# Patient Record
Sex: FEMALE | Race: WHITE | NOT HISPANIC OR LATINO | Employment: UNEMPLOYED | URBAN - METROPOLITAN AREA
[De-identification: names, ages, dates, MRNs, and addresses within clinical notes are randomized per-mention and may not be internally consistent; named-entity substitution may affect disease eponyms.]

---

## 2017-03-09 ENCOUNTER — COMMUNICATION - HEALTHEAST (OUTPATIENT)
Dept: INTERNAL MEDICINE | Facility: CLINIC | Age: 62
End: 2017-03-09

## 2017-05-26 ENCOUNTER — OFFICE VISIT - HEALTHEAST (OUTPATIENT)
Dept: INTERNAL MEDICINE | Facility: CLINIC | Age: 62
End: 2017-05-26

## 2017-05-26 DIAGNOSIS — F32.A DEPRESSION: ICD-10-CM

## 2017-05-26 DIAGNOSIS — Z12.39 BREAST CANCER SCREENING: ICD-10-CM

## 2017-05-26 DIAGNOSIS — F41.9 ANXIETY: ICD-10-CM

## 2017-05-26 DIAGNOSIS — E03.9 HYPOTHYROIDISM, UNSPECIFIED TYPE: ICD-10-CM

## 2017-05-26 DIAGNOSIS — G43.909 MIGRAINES: ICD-10-CM

## 2017-05-26 RX ORDER — ESCITALOPRAM OXALATE 10 MG/1
TABLET ORAL
Refills: 1 | Status: SHIPPED | COMMUNITY
Start: 2017-05-24 | End: 2024-01-11

## 2017-05-26 RX ORDER — ESCITALOPRAM OXALATE 5 MG/1
TABLET ORAL
Refills: 1 | Status: SHIPPED | COMMUNITY
Start: 2017-05-16 | End: 2024-01-11

## 2017-05-26 ASSESSMENT — MIFFLIN-ST. JEOR: SCORE: 1128.54

## 2017-05-31 ENCOUNTER — COMMUNICATION - HEALTHEAST (OUTPATIENT)
Dept: INTERNAL MEDICINE | Facility: CLINIC | Age: 62
End: 2017-05-31

## 2017-06-25 ENCOUNTER — COMMUNICATION - HEALTHEAST (OUTPATIENT)
Dept: INTERNAL MEDICINE | Facility: CLINIC | Age: 62
End: 2017-06-25

## 2017-06-27 ENCOUNTER — COMMUNICATION - HEALTHEAST (OUTPATIENT)
Dept: INTERNAL MEDICINE | Facility: CLINIC | Age: 62
End: 2017-06-27

## 2017-06-28 ENCOUNTER — AMBULATORY - HEALTHEAST (OUTPATIENT)
Dept: INTERNAL MEDICINE | Facility: CLINIC | Age: 62
End: 2017-06-28

## 2017-06-28 DIAGNOSIS — E03.9 HYPOTHYROID: ICD-10-CM

## 2017-07-05 ENCOUNTER — AMBULATORY - HEALTHEAST (OUTPATIENT)
Dept: LAB | Facility: CLINIC | Age: 62
End: 2017-07-05

## 2017-07-05 DIAGNOSIS — E03.9 HYPOTHYROID: ICD-10-CM

## 2017-07-06 ENCOUNTER — COMMUNICATION - HEALTHEAST (OUTPATIENT)
Dept: INTERNAL MEDICINE | Facility: CLINIC | Age: 62
End: 2017-07-06

## 2017-07-12 ENCOUNTER — COMMUNICATION - HEALTHEAST (OUTPATIENT)
Dept: INTERNAL MEDICINE | Facility: CLINIC | Age: 62
End: 2017-07-12

## 2017-07-31 ENCOUNTER — COMMUNICATION - HEALTHEAST (OUTPATIENT)
Dept: INTERNAL MEDICINE | Facility: CLINIC | Age: 62
End: 2017-07-31

## 2017-08-21 ENCOUNTER — COMMUNICATION - HEALTHEAST (OUTPATIENT)
Dept: INTERNAL MEDICINE | Facility: CLINIC | Age: 62
End: 2017-08-21

## 2017-09-05 ENCOUNTER — COMMUNICATION - HEALTHEAST (OUTPATIENT)
Dept: INTERNAL MEDICINE | Facility: CLINIC | Age: 62
End: 2017-09-05

## 2017-09-06 ENCOUNTER — AMBULATORY - HEALTHEAST (OUTPATIENT)
Dept: INTERNAL MEDICINE | Facility: CLINIC | Age: 62
End: 2017-09-06

## 2017-09-06 ENCOUNTER — AMBULATORY - HEALTHEAST (OUTPATIENT)
Dept: LAB | Facility: CLINIC | Age: 62
End: 2017-09-06

## 2017-09-06 DIAGNOSIS — E03.9 HYPOTHYROID: ICD-10-CM

## 2017-09-12 ENCOUNTER — COMMUNICATION - HEALTHEAST (OUTPATIENT)
Dept: INTERNAL MEDICINE | Facility: CLINIC | Age: 62
End: 2017-09-12

## 2017-09-19 ENCOUNTER — COMMUNICATION - HEALTHEAST (OUTPATIENT)
Dept: INTERNAL MEDICINE | Facility: CLINIC | Age: 62
End: 2017-09-19

## 2017-09-24 ENCOUNTER — COMMUNICATION - HEALTHEAST (OUTPATIENT)
Dept: INTERNAL MEDICINE | Facility: CLINIC | Age: 62
End: 2017-09-24

## 2017-10-31 ENCOUNTER — COMMUNICATION - HEALTHEAST (OUTPATIENT)
Dept: INTERNAL MEDICINE | Facility: CLINIC | Age: 62
End: 2017-10-31

## 2017-11-01 ENCOUNTER — AMBULATORY - HEALTHEAST (OUTPATIENT)
Dept: INTERNAL MEDICINE | Facility: CLINIC | Age: 62
End: 2017-11-01

## 2017-11-01 ENCOUNTER — AMBULATORY - HEALTHEAST (OUTPATIENT)
Dept: NURSING | Facility: CLINIC | Age: 62
End: 2017-11-01

## 2017-11-01 DIAGNOSIS — Z11.1 SCREENING FOR TUBERCULOSIS: ICD-10-CM

## 2017-11-03 ENCOUNTER — AMBULATORY - HEALTHEAST (OUTPATIENT)
Dept: NURSING | Facility: CLINIC | Age: 62
End: 2017-11-03

## 2017-11-03 DIAGNOSIS — Z00.00 HEALTHCARE MAINTENANCE: ICD-10-CM

## 2017-11-08 ENCOUNTER — AMBULATORY - HEALTHEAST (OUTPATIENT)
Dept: FAMILY MEDICINE | Facility: CLINIC | Age: 62
End: 2017-11-08

## 2017-11-08 DIAGNOSIS — Z11.1 VISIT FOR MANTOUX TEST: ICD-10-CM

## 2017-11-09 ENCOUNTER — AMBULATORY - HEALTHEAST (OUTPATIENT)
Dept: INTERNAL MEDICINE | Facility: CLINIC | Age: 62
End: 2017-11-09

## 2017-11-09 DIAGNOSIS — Z00.00 HEALTHCARE MAINTENANCE: ICD-10-CM

## 2017-11-10 ENCOUNTER — AMBULATORY - HEALTHEAST (OUTPATIENT)
Dept: NURSING | Facility: CLINIC | Age: 62
End: 2017-11-10

## 2017-11-13 ENCOUNTER — AMBULATORY - HEALTHEAST (OUTPATIENT)
Dept: NURSING | Facility: CLINIC | Age: 62
End: 2017-11-13

## 2017-11-13 DIAGNOSIS — Z00.00 HEALTHCARE MAINTENANCE: ICD-10-CM

## 2018-03-08 ENCOUNTER — COMMUNICATION - HEALTHEAST (OUTPATIENT)
Dept: FAMILY MEDICINE | Facility: CLINIC | Age: 63
End: 2018-03-08

## 2018-03-08 ENCOUNTER — OFFICE VISIT - HEALTHEAST (OUTPATIENT)
Dept: FAMILY MEDICINE | Facility: CLINIC | Age: 63
End: 2018-03-08

## 2018-03-08 DIAGNOSIS — M79.10 MYALGIA: ICD-10-CM

## 2018-03-08 DIAGNOSIS — Z12.31 VISIT FOR SCREENING MAMMOGRAM: ICD-10-CM

## 2018-03-08 DIAGNOSIS — R53.83 FATIGUE: ICD-10-CM

## 2018-03-08 DIAGNOSIS — Z72.0 NICOTINE ABUSE: ICD-10-CM

## 2018-03-08 DIAGNOSIS — R07.89 CHEST DISCOMFORT: ICD-10-CM

## 2018-03-08 DIAGNOSIS — Z12.39 BREAST CANCER SCREENING: ICD-10-CM

## 2018-03-08 DIAGNOSIS — Z12.39 SCREENING BREAST EXAMINATION: ICD-10-CM

## 2018-03-08 LAB
ALBUMIN SERPL-MCNC: 3.8 G/DL (ref 3.5–5)
ALBUMIN UR-MCNC: ABNORMAL MG/DL
ALP SERPL-CCNC: 86 U/L (ref 45–120)
ALT SERPL W P-5'-P-CCNC: 15 U/L (ref 0–45)
AMORPH CRY #/AREA URNS HPF: ABNORMAL /[HPF]
ANION GAP SERPL CALCULATED.3IONS-SCNC: 7 MMOL/L (ref 5–18)
APPEARANCE UR: ABNORMAL
AST SERPL W P-5'-P-CCNC: 17 U/L (ref 0–40)
BACTERIA #/AREA URNS HPF: ABNORMAL HPF
BASOPHILS # BLD AUTO: 0 THOU/UL (ref 0–0.2)
BASOPHILS NFR BLD AUTO: 1 % (ref 0–2)
BILIRUB SERPL-MCNC: 0.7 MG/DL (ref 0–1)
BILIRUB UR QL STRIP: NEGATIVE
BUN SERPL-MCNC: 12 MG/DL (ref 8–22)
CALCIUM SERPL-MCNC: 9.5 MG/DL (ref 8.5–10.5)
CHLORIDE BLD-SCNC: 106 MMOL/L (ref 98–107)
CO2 SERPL-SCNC: 27 MMOL/L (ref 22–31)
COLOR UR AUTO: YELLOW
CREAT SERPL-MCNC: 0.68 MG/DL (ref 0.6–1.1)
EOSINOPHIL # BLD AUTO: 0.3 THOU/UL (ref 0–0.4)
EOSINOPHIL NFR BLD AUTO: 6 % (ref 0–6)
ERYTHROCYTE [DISTWIDTH] IN BLOOD BY AUTOMATED COUNT: 11.7 % (ref 11–14.5)
ERYTHROCYTE [SEDIMENTATION RATE] IN BLOOD BY WESTERGREN METHOD: 5 MM/HR (ref 0–20)
GFR SERPL CREATININE-BSD FRML MDRD: >60 ML/MIN/1.73M2
GLUCOSE BLD-MCNC: 87 MG/DL (ref 70–125)
GLUCOSE UR STRIP-MCNC: NEGATIVE MG/DL
HCT VFR BLD AUTO: 41.5 % (ref 35–47)
HGB BLD-MCNC: 14.5 G/DL (ref 12–16)
HGB UR QL STRIP: NEGATIVE
KETONES UR STRIP-MCNC: NEGATIVE MG/DL
LEUKOCYTE ESTERASE UR QL STRIP: NEGATIVE
LYMPHOCYTES # BLD AUTO: 2.5 THOU/UL (ref 0.8–4.4)
LYMPHOCYTES NFR BLD AUTO: 42 % (ref 20–40)
MCH RBC QN AUTO: 32 PG (ref 27–34)
MCHC RBC AUTO-ENTMCNC: 34.9 G/DL (ref 32–36)
MCV RBC AUTO: 92 FL (ref 80–100)
MONOCYTES # BLD AUTO: 0.4 THOU/UL (ref 0–0.9)
MONOCYTES NFR BLD AUTO: 7 % (ref 2–10)
NEUTROPHILS # BLD AUTO: 2.7 THOU/UL (ref 2–7.7)
NEUTROPHILS NFR BLD AUTO: 45 % (ref 50–70)
NITRATE UR QL: NEGATIVE
PH UR STRIP: 7.5 [PH] (ref 5–8)
PLATELET # BLD AUTO: 263 THOU/UL (ref 140–440)
PMV BLD AUTO: 7.6 FL (ref 7–10)
POTASSIUM BLD-SCNC: 4.5 MMOL/L (ref 3.5–5)
PROT SERPL-MCNC: 6.9 G/DL (ref 6–8)
RBC # BLD AUTO: 4.52 MILL/UL (ref 3.8–5.4)
RBC #/AREA URNS AUTO: ABNORMAL HPF
SODIUM SERPL-SCNC: 140 MMOL/L (ref 136–145)
SP GR UR STRIP: 1.01 (ref 1–1.03)
SQUAMOUS #/AREA URNS AUTO: ABNORMAL LPF
TSH SERPL DL<=0.005 MIU/L-ACNC: 0.43 UIU/ML (ref 0.3–5)
UROBILINOGEN UR STRIP-ACNC: ABNORMAL
WBC #/AREA URNS AUTO: ABNORMAL HPF
WBC: 5.9 THOU/UL (ref 4–11)

## 2018-03-09 ENCOUNTER — COMMUNICATION - HEALTHEAST (OUTPATIENT)
Dept: FAMILY MEDICINE | Facility: CLINIC | Age: 63
End: 2018-03-09

## 2018-03-09 ENCOUNTER — AMBULATORY - HEALTHEAST (OUTPATIENT)
Dept: FAMILY MEDICINE | Facility: CLINIC | Age: 63
End: 2018-03-09

## 2018-03-09 DIAGNOSIS — Z12.39 BREAST CANCER SCREENING: ICD-10-CM

## 2018-03-09 DIAGNOSIS — Z12.31 VISIT FOR SCREENING MAMMOGRAM: ICD-10-CM

## 2018-03-09 LAB
25(OH)D3 SERPL-MCNC: 70.5 NG/ML (ref 30–80)
25(OH)D3 SERPL-MCNC: 70.5 NG/ML (ref 30–80)

## 2018-04-02 ENCOUNTER — COMMUNICATION - HEALTHEAST (OUTPATIENT)
Dept: FAMILY MEDICINE | Facility: CLINIC | Age: 63
End: 2018-04-02

## 2018-05-30 ENCOUNTER — COMMUNICATION - HEALTHEAST (OUTPATIENT)
Dept: FAMILY MEDICINE | Facility: CLINIC | Age: 63
End: 2018-05-30

## 2018-06-11 ENCOUNTER — RECORDS - HEALTHEAST (OUTPATIENT)
Dept: ADMINISTRATIVE | Facility: OTHER | Age: 63
End: 2018-06-11

## 2018-06-19 ENCOUNTER — COMMUNICATION - HEALTHEAST (OUTPATIENT)
Dept: FAMILY MEDICINE | Facility: CLINIC | Age: 63
End: 2018-06-19

## 2018-09-19 ENCOUNTER — COMMUNICATION - HEALTHEAST (OUTPATIENT)
Dept: FAMILY MEDICINE | Facility: CLINIC | Age: 63
End: 2018-09-19

## 2018-09-19 DIAGNOSIS — E03.9 HYPOTHYROIDISM, UNSPECIFIED TYPE: ICD-10-CM

## 2018-10-15 ENCOUNTER — COMMUNICATION - HEALTHEAST (OUTPATIENT)
Dept: INTERNAL MEDICINE | Facility: CLINIC | Age: 63
End: 2018-10-15

## 2019-06-14 ENCOUNTER — COMMUNICATION - HEALTHEAST (OUTPATIENT)
Dept: FAMILY MEDICINE | Facility: CLINIC | Age: 64
End: 2019-06-14

## 2019-06-14 DIAGNOSIS — E03.9 HYPOTHYROIDISM, UNSPECIFIED TYPE: ICD-10-CM

## 2019-08-02 ENCOUNTER — COMMUNICATION - HEALTHEAST (OUTPATIENT)
Dept: FAMILY MEDICINE | Facility: CLINIC | Age: 64
End: 2019-08-02

## 2019-08-02 ENCOUNTER — COMMUNICATION - HEALTHEAST (OUTPATIENT)
Dept: SCHEDULING | Facility: CLINIC | Age: 64
End: 2019-08-02

## 2019-09-16 ENCOUNTER — COMMUNICATION - HEALTHEAST (OUTPATIENT)
Dept: FAMILY MEDICINE | Facility: CLINIC | Age: 64
End: 2019-09-16

## 2019-09-16 DIAGNOSIS — E03.9 HYPOTHYROIDISM, UNSPECIFIED TYPE: ICD-10-CM

## 2019-10-23 ENCOUNTER — OFFICE VISIT - HEALTHEAST (OUTPATIENT)
Dept: FAMILY MEDICINE | Facility: CLINIC | Age: 64
End: 2019-10-23

## 2019-10-23 DIAGNOSIS — Z12.11 SCREEN FOR COLON CANCER: ICD-10-CM

## 2019-10-23 DIAGNOSIS — F51.12 INSUFFICIENT SLEEP SYNDROME: ICD-10-CM

## 2019-10-23 DIAGNOSIS — Z12.31 VISIT FOR SCREENING MAMMOGRAM: ICD-10-CM

## 2019-10-23 DIAGNOSIS — E03.9 HYPOTHYROIDISM, UNSPECIFIED TYPE: ICD-10-CM

## 2019-10-23 DIAGNOSIS — R53.83 FATIGUE, UNSPECIFIED TYPE: ICD-10-CM

## 2019-10-23 LAB
ALBUMIN SERPL-MCNC: 4 G/DL (ref 3.5–5)
ALP SERPL-CCNC: 92 U/L (ref 45–120)
ALT SERPL W P-5'-P-CCNC: 29 U/L (ref 0–45)
ANION GAP SERPL CALCULATED.3IONS-SCNC: 5 MMOL/L (ref 5–18)
AST SERPL W P-5'-P-CCNC: 21 U/L (ref 0–40)
BILIRUB SERPL-MCNC: 0.9 MG/DL (ref 0–1)
BUN SERPL-MCNC: 13 MG/DL (ref 8–22)
CALCIUM SERPL-MCNC: 9.6 MG/DL (ref 8.5–10.5)
CHLORIDE BLD-SCNC: 107 MMOL/L (ref 98–107)
CO2 SERPL-SCNC: 28 MMOL/L (ref 22–31)
CREAT SERPL-MCNC: 0.68 MG/DL (ref 0.6–1.1)
ERYTHROCYTE [DISTWIDTH] IN BLOOD BY AUTOMATED COUNT: 12 % (ref 11–14.5)
GFR SERPL CREATININE-BSD FRML MDRD: >60 ML/MIN/1.73M2
GLUCOSE BLD-MCNC: 78 MG/DL (ref 70–125)
HCT VFR BLD AUTO: 40.9 % (ref 35–47)
HGB BLD-MCNC: 14 G/DL (ref 12–16)
MCH RBC QN AUTO: 32.5 PG (ref 27–34)
MCHC RBC AUTO-ENTMCNC: 34.3 G/DL (ref 32–36)
MCV RBC AUTO: 95 FL (ref 80–100)
PLATELET # BLD AUTO: 264 THOU/UL (ref 140–440)
PMV BLD AUTO: 7.7 FL (ref 7–10)
POTASSIUM BLD-SCNC: 4.2 MMOL/L (ref 3.5–5)
PROT SERPL-MCNC: 6.8 G/DL (ref 6–8)
RBC # BLD AUTO: 4.31 MILL/UL (ref 3.8–5.4)
SODIUM SERPL-SCNC: 140 MMOL/L (ref 136–145)
TSH SERPL DL<=0.005 MIU/L-ACNC: 0.69 UIU/ML (ref 0.3–5)
WBC: 6 THOU/UL (ref 4–11)

## 2019-10-23 ASSESSMENT — MIFFLIN-ST. JEOR: SCORE: 1085.91

## 2020-02-03 ENCOUNTER — OFFICE VISIT - HEALTHEAST (OUTPATIENT)
Dept: FAMILY MEDICINE | Facility: CLINIC | Age: 65
End: 2020-02-03

## 2020-02-03 ENCOUNTER — COMMUNICATION - HEALTHEAST (OUTPATIENT)
Dept: FAMILY MEDICINE | Facility: CLINIC | Age: 65
End: 2020-02-03

## 2020-02-03 DIAGNOSIS — Z72.0 NICOTINE ABUSE: ICD-10-CM

## 2020-02-03 DIAGNOSIS — S62.101D CLOSED FRACTURE OF RIGHT WRIST WITH ROUTINE HEALING, SUBSEQUENT ENCOUNTER: ICD-10-CM

## 2020-02-03 DIAGNOSIS — E03.9 HYPOTHYROIDISM, UNSPECIFIED TYPE: ICD-10-CM

## 2020-02-03 DIAGNOSIS — Z98.890 PONV (POSTOPERATIVE NAUSEA AND VOMITING): ICD-10-CM

## 2020-02-03 DIAGNOSIS — Z01.818 PREOP GENERAL PHYSICAL EXAM: ICD-10-CM

## 2020-02-03 DIAGNOSIS — Z01.818 ENCOUNTER FOR PREOPERATIVE EXAMINATION FOR GENERAL SURGICAL PROCEDURE: ICD-10-CM

## 2020-02-03 DIAGNOSIS — R11.2 PONV (POSTOPERATIVE NAUSEA AND VOMITING): ICD-10-CM

## 2020-02-03 ASSESSMENT — MIFFLIN-ST. JEOR: SCORE: 1066.17

## 2020-02-04 LAB
25(OH)D3 SERPL-MCNC: 42.5 NG/ML (ref 30–80)
25(OH)D3 SERPL-MCNC: 42.5 NG/ML (ref 30–80)

## 2020-11-16 ENCOUNTER — COMMUNICATION - HEALTHEAST (OUTPATIENT)
Dept: FAMILY MEDICINE | Facility: CLINIC | Age: 65
End: 2020-11-16

## 2020-11-16 DIAGNOSIS — E03.9 HYPOTHYROIDISM, UNSPECIFIED TYPE: ICD-10-CM

## 2021-01-12 ENCOUNTER — OFFICE VISIT - HEALTHEAST (OUTPATIENT)
Dept: FAMILY MEDICINE | Facility: CLINIC | Age: 66
End: 2021-01-12

## 2021-01-12 ENCOUNTER — AMBULATORY - HEALTHEAST (OUTPATIENT)
Dept: OTHER | Facility: CLINIC | Age: 66
End: 2021-01-12

## 2021-01-12 DIAGNOSIS — L60.3 DYSTROPHIC NAIL: ICD-10-CM

## 2021-01-12 DIAGNOSIS — E03.9 HYPOTHYROIDISM, UNSPECIFIED TYPE: ICD-10-CM

## 2021-01-12 LAB
T4 FREE SERPL-MCNC: 1.3 NG/DL (ref 0.7–1.8)
TSH SERPL DL<=0.005 MIU/L-ACNC: 0.16 UIU/ML (ref 0.3–5)

## 2021-01-14 ENCOUNTER — AMBULATORY - HEALTHEAST (OUTPATIENT)
Dept: FAMILY MEDICINE | Facility: CLINIC | Age: 66
End: 2021-01-14

## 2021-01-14 DIAGNOSIS — E03.9 HYPOTHYROIDISM, UNSPECIFIED TYPE: ICD-10-CM

## 2021-03-02 ENCOUNTER — AMBULATORY - HEALTHEAST (OUTPATIENT)
Dept: LAB | Facility: CLINIC | Age: 66
End: 2021-03-02

## 2021-03-02 DIAGNOSIS — E03.9 HYPOTHYROIDISM, UNSPECIFIED TYPE: ICD-10-CM

## 2021-03-02 DIAGNOSIS — L60.3 DYSTROPHIC NAIL: ICD-10-CM

## 2021-03-02 LAB
ALBUMIN SERPL-MCNC: 4.1 G/DL (ref 3.5–5)
ALP SERPL-CCNC: 89 U/L (ref 45–120)
ALT SERPL W P-5'-P-CCNC: 11 U/L (ref 0–45)
AST SERPL W P-5'-P-CCNC: 13 U/L (ref 0–40)
BILIRUB DIRECT SERPL-MCNC: 0.2 MG/DL
BILIRUB SERPL-MCNC: 0.5 MG/DL (ref 0–1)
CHOLEST SERPL-MCNC: 213 MG/DL
FASTING STATUS PATIENT QL REPORTED: NO
FASTING STATUS PATIENT QL REPORTED: NO
GLUCOSE BLD-MCNC: 80 MG/DL (ref 74–125)
HDLC SERPL-MCNC: 70 MG/DL
LDLC SERPL CALC-MCNC: 127 MG/DL
PROT SERPL-MCNC: 6.8 G/DL (ref 6–8)
TRIGL SERPL-MCNC: 78 MG/DL
TSH SERPL DL<=0.005 MIU/L-ACNC: 0.55 UIU/ML (ref 0.3–5)

## 2021-03-15 ENCOUNTER — COMMUNICATION - HEALTHEAST (OUTPATIENT)
Dept: FAMILY MEDICINE | Facility: CLINIC | Age: 66
End: 2021-03-15

## 2021-03-15 DIAGNOSIS — L60.3 DYSTROPHIC NAIL: ICD-10-CM

## 2021-04-12 ENCOUNTER — OFFICE VISIT - HEALTHEAST (OUTPATIENT)
Dept: FAMILY MEDICINE | Facility: CLINIC | Age: 66
End: 2021-04-12

## 2021-04-12 DIAGNOSIS — L60.3 DYSTROPHIC NAIL: ICD-10-CM

## 2021-04-12 DIAGNOSIS — Z12.11 SPECIAL SCREENING FOR MALIGNANT NEOPLASMS, COLON: ICD-10-CM

## 2021-04-12 DIAGNOSIS — E03.9 HYPOTHYROIDISM, UNSPECIFIED TYPE: ICD-10-CM

## 2021-04-12 RX ORDER — LEVOTHYROXINE SODIUM 100 UG/1
TABLET ORAL
Qty: 90 TABLET | Refills: 1 | Status: SHIPPED | OUTPATIENT
Start: 2021-04-12 | End: 2021-10-15

## 2021-04-12 RX ORDER — ZOLPIDEM TARTRATE 5 MG/1
5 TABLET ORAL
Status: SHIPPED | COMMUNITY
Start: 2021-03-26 | End: 2024-01-11

## 2021-04-12 ASSESSMENT — MIFFLIN-ST. JEOR: SCORE: 1117.88

## 2021-05-13 ENCOUNTER — AMBULATORY - HEALTHEAST (OUTPATIENT)
Dept: LAB | Facility: CLINIC | Age: 66
End: 2021-05-13

## 2021-05-13 DIAGNOSIS — E03.9 HYPOTHYROIDISM, UNSPECIFIED TYPE: ICD-10-CM

## 2021-05-13 DIAGNOSIS — L60.3 DYSTROPHIC NAIL: ICD-10-CM

## 2021-05-13 LAB
ALBUMIN SERPL-MCNC: 4 G/DL (ref 3.5–5)
ALP SERPL-CCNC: 76 U/L (ref 45–120)
ALT SERPL W P-5'-P-CCNC: 13 U/L (ref 0–45)
AST SERPL W P-5'-P-CCNC: 15 U/L (ref 0–40)
BILIRUB DIRECT SERPL-MCNC: 0.2 MG/DL
BILIRUB SERPL-MCNC: 0.6 MG/DL (ref 0–1)
PROT SERPL-MCNC: 6.6 G/DL (ref 6–8)
TSH SERPL DL<=0.005 MIU/L-ACNC: 0.64 UIU/ML (ref 0.3–5)

## 2021-05-24 ENCOUNTER — COMMUNICATION - HEALTHEAST (OUTPATIENT)
Dept: FAMILY MEDICINE | Facility: CLINIC | Age: 66
End: 2021-05-24

## 2021-05-24 DIAGNOSIS — L60.3 DYSTROPHIC NAIL: ICD-10-CM

## 2021-05-25 RX ORDER — TERBINAFINE HYDROCHLORIDE 250 MG/1
250 TABLET ORAL DAILY
Qty: 42 TABLET | Refills: 0 | Status: SHIPPED | OUTPATIENT
Start: 2021-05-25 | End: 2021-07-06

## 2021-05-29 NOTE — TELEPHONE ENCOUNTER
RN cannot approve Refill Request    RN can NOT refill this medication PCP messaged that patient is overdue for Labs and Office Visit. Last office visit: 3/8/2018 Jason Trejo MD Last Physical: Visit date not found Last MTM visit: Visit date not found Last visit same specialty: 3/8/2018 Jason Trejo MD.  Next visit within 3 mo: Visit date not found  Next physical within 3 mo: Visit date not found      Dylan Simon, [facility] Triage/Med Refill 6/15/2019    Requested Prescriptions   Pending Prescriptions Disp Refills     levothyroxine (SYNTHROID, LEVOTHROID) 100 MCG tablet [Pharmacy Med Name: LEVOTHYROXINE 0.100MG (100MCG) TAB] 90 tablet 0     Sig: TAKE 1 TABLET(100 MCG) BY MOUTH DAILY       Thyroid Hormones Protocol Failed - 6/14/2019 10:35 AM        Failed - Provider visit in past 12 months or next 3 months     Last office visit with prescriber/PCP: 3/8/2018 Jason Trejo MD OR same dept: Visit date not found OR same specialty: 3/8/2018 Jason Trejo MD  Last physical: Visit date not found Last MTM visit: Visit date not found   Next visit within 3 mo: Visit date not found  Next physical within 3 mo: Visit date not found  Prescriber OR PCP: Jason Trejo MD  Last diagnosis associated with med order: 1. Hypothyroidism, unspecified type  - levothyroxine (SYNTHROID, LEVOTHROID) 100 MCG tablet [Pharmacy Med Name: LEVOTHYROXINE 0.100MG (100MCG) TAB]; TAKE 1 TABLET(100 MCG) BY MOUTH DAILY  Dispense: 90 tablet; Refill: 0    If protocol passes may refill for 12 months if within 3 months of last provider visit (or a total of 15 months).             Failed - TSH on file in past 12 months for patient age 12 & older     TSH   Date Value Ref Range Status   03/08/2018 0.43 0.30 - 5.00 uIU/mL Final

## 2021-05-31 VITALS — HEIGHT: 65 IN | BODY MASS INDEX: 21.33 KG/M2 | WEIGHT: 128 LBS

## 2021-05-31 NOTE — TELEPHONE ENCOUNTER
RN Triage:       Patient is calling in stating that she is feeling extreme fatigue, low appetite, dry skin and constipated. This started 1 month ago. Patient is having problems sleeping also. She thinks it is her thyroid. Last thyroid check was 3/2018 Patient wanted to be seen on Monday. She was warm transferred to the . No appointments at the clinic until 8/12/19. Per Rn protocol she should be seen within 3 days.   Harini Mondragon RN, BSN [facility] Triage Nurse      Reason for Disposition    MILD weakness (i.e., does not interfere with ability to work, go to school, normal activities) and persists > 1 week    Protocols used: WEAKNESS (GENERALIZED) AND FATIGUE-A-OH

## 2021-05-31 NOTE — TELEPHONE ENCOUNTER
Who is calling:  patient  Reason for Call:  Patient has been feeling fatigue x1 month or more & has been having constipation. She stated that she believes it's due to her thyroid issues. She would like to know if she can come in for a lab or if she needs to be seen by Dr Trejo or another provider.  Date of last appointment with primary care: 3/8/18  Okay to leave a detailed message: Yes

## 2021-06-01 VITALS — BODY MASS INDEX: 21.64 KG/M2 | WEIGHT: 128.06 LBS

## 2021-06-01 NOTE — TELEPHONE ENCOUNTER
RN cannot approve Refill Request    RN can NOT refill this medication Protocol failed and NO refill given.         Christy Maguire, [facility] Triage/Med Refill 9/16/2019    Requested Prescriptions   Pending Prescriptions Disp Refills     levothyroxine (SYNTHROID, LEVOTHROID) 100 MCG tablet [Pharmacy Med Name: LEVOTHYROXINE 0.100MG (100MCG) TAB] 90 tablet 3     Sig: TAKE 1 TABLET(100 MCG) BY MOUTH DAILY       Thyroid Hormones Protocol Failed - 9/16/2019  3:06 PM        Failed - Provider visit in past 12 months or next 3 months     Last office visit with prescriber/PCP: 8/24/2016 Kylie Corbett MD OR same dept: Visit date not found OR same specialty: 3/8/2018 Jason Trejo MD  Last physical: Visit date not found Last MTM visit: Visit date not found   Next visit within 3 mo: Visit date not found  Next physical within 3 mo: Visit date not found  Prescriber OR PCP: Kylie Corbett MD  Last diagnosis associated with med order: 1. Hypothyroidism, unspecified type  - levothyroxine (SYNTHROID, LEVOTHROID) 100 MCG tablet [Pharmacy Med Name: LEVOTHYROXINE 0.100MG (100MCG) TAB]; TAKE 1 TABLET(100 MCG) BY MOUTH DAILY  Dispense: 90 tablet; Refill: 0    If protocol passes may refill for 12 months if within 3 months of last provider visit (or a total of 15 months).             Failed - TSH on file in past 12 months for patient age 12 & older     TSH   Date Value Ref Range Status   03/08/2018 0.43 0.30 - 5.00 uIU/mL Final

## 2021-06-02 NOTE — PROGRESS NOTES
Assessment/Plan:        Diagnoses and all orders for this visit:    Fatigue, unspecified type  -     Thyroid Cascade  -     Comprehensive Metabolic Panel  -     HM2(CBC w/o Differential)  -     Ambulatory referral to Sleep Medicine   Can consider OB/GYN referral for assessing risk/benefit of hormone replacement therapy in the future.  However I think most likely her fatigue is related to her missing deep/REM sleep which typically is the 4 AM-6 AM time of the night.  I do not believe her fatigue will get better until she improves her sleep hygiene and gets consistent 8 hours of sleep every night.    Will rule out other causes of fatigue    Insufficient deep sleep  -improve sleep hygiene and sleep routine at bedtime  -stop watching screens 1 hour before bedtime   -avoid caffeine afternoon and at 3 am  -consider meditation and relaxation techniques for when she wakes up at  3am . -Sleep hygiene tips were advised to patient.     Hypothyroidism, unspecified type  -     levothyroxine (SYNTHROID, LEVOTHROID) 100 MCG tablet; TAKE 1 TABLET(100 MCG) BY MOUTH DAILY  Dispense: 90 tablet; Refill: 3    Visit for screening mammogram  -     Mammo Screening Bilateral; Future; Expected date: 10/23/2019    Screen for colon cancer  -     Ambulatory referral for Colonoscopy    Follow-up in 6 months for annual physical.        Subjective:    Patient ID: Jane Weldon is a 64 y.o. female.    HPI   Fatigue: daytime somlience in middle afternoon.  Has been ongoing for a long time patient is unsure exactly how long it has been going.  Denies shortness of breath or chest pains.  States that she does have very interrupted sleep nightly.  She typically goes to sleep around 1030 watches TV up until the time of her bed time and then wakes up around 3 AM where she gets up and starts her morning start drinking caffeine and then if she needs another morning nap.  Patient states that she does not really have a consistent sleep schedule.  She tends  "to be very tired in the afternoons.  She does not have a screen in her bedroom.  She is wondering if it is possible that her sleeping could be an balanced female hormonal issues.  And wondering if hormonal replacement therapy would be helpful for her to sleep better.    Hypothyroidism: Takes 100 mcg of Synthroid seems to work pretty well her TSH is have always been within normal limits with this dosing.  Her only complaint is her fatigue at this point.  No side effects of the medication.    Takes Lexapro for depression but is prescribed by another provider.  She does not need any refills from this today.    The following portions of the patient's history were reviewed and updated as appropriate: allergies, current medications, past family history, past medical history, past social history, past surgical history and problem list.    Review of Systems   Constitutional: Positive for fatigue.   All other systems reviewed and are negative.            Objective:    Physical Exam  /74 (Patient Site: Left Arm, Patient Position: Sitting, Cuff Size: Adult Regular)   Pulse (!) 58   Ht 5' 4.5\" (1.638 m)   Wt 118 lb 9.6 oz (53.8 kg)   SpO2 98%   BMI 20.04 kg/m    General Appearance:  Alert, cooperative, no distress, appears stated age   Head:  Normocephalic, without obvious abnormality, atraumatic   Eyes:  PERRL, conjunctiva/corneas clear, EOM's intact   Ears:  Normal TM's and external ear canals, both ears   Nose: Nares normal, septum midline, mucosa normal, no drainage   Throat: Lips, mucosa, and tongue normal; teeth and gums normal   Neck: Supple, symmetrical, trachea midline, no adenopathy, thyroid: not enlarged, symmetric, no tenderness/mass/nodules   Back:   Symmetric, no curvature, ROM normal, no CVA tenderness   Lungs:   Clear to auscultation bilaterally, respirations unlabored   Chest Wall:  No tenderness or deformity   Heart:  Regular rate and rhythm, S1, S2 normal, no murmur, rub or gallop   Abdomen:   " Soft, non-tender, bowel sounds active all four quadrants,  no masses, no organomegaly   Extremities: Extremities normal, atraumatic, no cyanosis or edema   Skin: Skin color, texture, turgor normal, no rashes or lesions   Lymph nodes: Cervical and supraclavicular normal   Neurologic: Normal,Coordinated, smooth gait, balance, rapid alternating movements, sensory functioning, and cranial nerves II-XII grossly intact. DTR's+2 bilaterally brachioradialis, knee, ankle.

## 2021-06-02 NOTE — PATIENT INSTRUCTIONS - HE
Why we Sleep by Sebastian Perkins     Recommendations for good sleep hygiene include the following:    --maintaining a regular morning rising time;    --increasing activity level in the afternoon;    --avoiding exercise in the evening or before bedtime;    --increasing daytime exposure to bright light;   -- taking a hot bath within the two hours before bedtime;    --avoiding caffeine, nicotine, alcohol, and excessive food or fluid intake in the evening;   --using the bedroom only for sleep and sex; and    --practicing a bedtime routine that includes minimizing light and noise exposure and turning off the television.   --Naps may help, but should be limited to less than one hour in the early  afternoon.

## 2021-06-03 VITALS
DIASTOLIC BLOOD PRESSURE: 74 MMHG | HEIGHT: 65 IN | OXYGEN SATURATION: 98 % | WEIGHT: 118.6 LBS | SYSTOLIC BLOOD PRESSURE: 114 MMHG | HEART RATE: 58 BPM | BODY MASS INDEX: 19.76 KG/M2

## 2021-06-04 VITALS
OXYGEN SATURATION: 97 % | DIASTOLIC BLOOD PRESSURE: 76 MMHG | HEIGHT: 64 IN | HEART RATE: 66 BPM | SYSTOLIC BLOOD PRESSURE: 117 MMHG | WEIGHT: 116 LBS | BODY MASS INDEX: 19.81 KG/M2

## 2021-06-05 VITALS
BODY MASS INDEX: 21.75 KG/M2 | SYSTOLIC BLOOD PRESSURE: 109 MMHG | HEART RATE: 72 BPM | DIASTOLIC BLOOD PRESSURE: 69 MMHG | OXYGEN SATURATION: 95 % | HEIGHT: 64 IN | WEIGHT: 127.4 LBS

## 2021-06-05 VITALS
SYSTOLIC BLOOD PRESSURE: 106 MMHG | BODY MASS INDEX: 27.98 KG/M2 | OXYGEN SATURATION: 98 % | HEART RATE: 67 BPM | RESPIRATION RATE: 16 BRPM | DIASTOLIC BLOOD PRESSURE: 67 MMHG | WEIGHT: 163 LBS

## 2021-06-05 NOTE — PATIENT INSTRUCTIONS - HE
Before Your Surgery       Call your surgeon if there is any change in your health. This includes signs of a cold or flu (such as a sore throat, runny nose, cough, rash or fever).       Do not smoke, drink alcohol or take over the counter medicine (unless your surgeon or primary care doctor tells you to) for the 24 hours before and after surgery.       If you take prescribed drugs: Follow your doctor s orders about which medicines to take and which to stop until after surgery.      Eating and drinking prior to surgery: follow the instructions from your surgeon.      Take a shower or bath the night before surgery. Use the soap your surgeon gave you to gently clean your skin. If you do not have soap from your surgeon, use your regular soap. Do not shave or scrub the surgery site. Wear clean pajamas and have clean sheets on your bed.             Hold all supplements, aspirin and NSAIDs for 7 days prior to surgery.    Follow your surgeon's direction on when to stop eating and drinking prior to surgery.    OK to take Levothyroxine as before in the morning    Your surgeon will be managing your pain after your surgery.      Remove all jewelry and metal piercings before your surgery.     Remove nail polish from fingers before surgery.

## 2021-06-05 NOTE — TELEPHONE ENCOUNTER
New Appointment Needed  What is the reason for the visit:    Pre-Op Appt Request  When is the surgery? :  Tomorrow 2/04/20  Where is the surgery?:   [facility]  Who is the surgeon? :  Dr Magdaleno  What type of surgery is being done?: repair broken wrist  Provider Preference: Any available  How soon do you need to be seen?: today  Waitlist offered?: No  Okay to leave a detailed message:  Yes

## 2021-06-05 NOTE — PROGRESS NOTES
Preoperative Exam    Scheduled Procedure: Right wrist surgery   Surgery Date:  02/04/2020  Surgery Location: [facility]     Surgeon:  Dr. Magdaleno     Assessment/Plan:     1. Preop general physical exam  Vitamin D, Total (25-Hydroxy)   2. Nicotine abuse     3. Hypothyroidism, unspecified type     4. PONV (postoperative nausea and vomiting)     5. Encounter for preoperative examination for general surgical procedure     6. Closed fracture of right wrist with routine healing, subsequent encounter  DXA Bone Density Scan     Continue taking levothyroxine as before in the morning.  Take citalopram in the evening as before.  Follow-up for annual physical.  Needs health maintenance update.  Declines pneumonia and flu shot.  Informed me that Dr. Magdaleno would like vitamin D checked which has been ordered for her.    Reviewed normal CBC and CMP done 10/2019.    Surgical Procedure Risk: Low (reported cardiac risk generally < 1%)  Have you had prior anesthesia?: Yes  Have you or any family members had a previous anesthesia reaction:  No  Do you or any family members have a history of a clotting or bleeding disorder?: No  Cardiac Risk Assessment: no increased risk for major cardiac complications    APPROVAL GIVEN to proceed with proposed procedure, without further diagnostic evaluation    Please Note: Current smoker , will need osteoporosis  Follow up.  She has a history of postoperative nausea and vomiting.    Functional Status: Independent  Patient plans to recover at home alone.     Subjective:      Chief Complaint   Patient presents with     Pre-op Exam     DOS 02/04/2020 @ [facility], RIGHT wrist surgery with Dr. Sharla Solislore Weldon is a 64 y.o. female who presents for a preoperative consultation.   Fell down on ice last Friday and broke her right wrist.  There is noted history of osteoporosis and she is to be on Fosamax that she discontinued because she felt it was causing lipoma  tumors.    She feels well and denies any shortness of breath, chest pain.  She denies any abnormal bruising or bleeding.  She endorses a fair appetite.  No other acute concerns.    All other systems reviewed and are negative, other than those listed in the HPI.    Pertinent History  Do you have difficulty breathing or chest pain after walking up a flight of stairs: No  History of obstructive sleep apnea: No  Steroid use in the last 6 months: No  Frequent Aspirin/NSAID use: No  Prior Blood Transfusion: Yes: for ectopic pregnancy  Prior Blood Transfusion Reaction: No  If for some reason prior to, during or after the procedure, if it is medically indicated, would you be willing to have a blood transfusion?:  There is no transfusion refusal.    Current Outpatient Medications   Medication Sig Dispense Refill     ascorbic acid (VITAMIN C) Powd Take by mouth daily. 1/2 tsp 2000mg       calcium carbonate-vit D3-min (CALCIUM-VITAMIN D) 600 mg calcium- 400 unit Tab Take 1 tablet by mouth daily.        CHOLECALCIFEROL, VITAMIN D3, (VITAMIN D3 ORAL) Take 5,000 Units by mouth daily.       escitalopram oxalate (LEXAPRO) 10 MG tablet   1     escitalopram oxalate (LEXAPRO) 5 MG tablet TK 1 T PO  QD ALONG WITH A 10MG T  1     levothyroxine (SYNTHROID, LEVOTHROID) 100 MCG tablet TAKE 1 TABLET(100 MCG) BY MOUTH DAILY 90 tablet 3     OM-3/E/LINOL/ALA/OLEIC/GLA/LIP (OMEGA 3-6-9 ORAL) Take 1 capsule by mouth 3 (three) times a day.       HYDROcodone-acetaminophen 5-325 mg per tablet Take 1 tablet by mouth every 6 (six) hours as needed for pain. 10 tablet 0     No current facility-administered medications for this visit.         No Known Allergies    Patient Active Problem List   Diagnosis     Allergic Rhinitis     Hoarseness     Insomnia     Lipoma Of The Adipose Tissue     Tingling (Paresthesia)     Hypothyroidism     Osteopenia     Symptomatic Menopause     Osteoporosis     Nocturia     Anxiety     Insomnia     Nicotine abuse       Past  Medical History:   Diagnosis Date     Osteopenia        Past Surgical History:   Procedure Laterality Date     TX APPENDECTOMY      Description: Appendectomy;  Recorded: 05/04/2009;     TX TREAT ECTOPIC PREG,RMV TUBE/OVARY      Description: Salpingectomy For Ectopic Pregnancy;  Recorded: 05/04/2009;       Social History     Socioeconomic History     Marital status:      Spouse name: Not on file     Number of children: Not on file     Years of education: Not on file     Highest education level: Not on file   Occupational History     Not on file   Social Needs     Financial resource strain: Not on file     Food insecurity:     Worry: Not on file     Inability: Not on file     Transportation needs:     Medical: Not on file     Non-medical: Not on file   Tobacco Use     Smoking status: Current Every Day Smoker     Packs/day: 0.50     Years: 40.00     Pack years: 20.00     Types: Cigarettes     Smokeless tobacco: Never Used   Substance and Sexual Activity     Alcohol use: Yes     Comment: occasional wine     Drug use: No     Sexual activity: Not on file   Lifestyle     Physical activity:     Days per week: Not on file     Minutes per session: Not on file     Stress: Not on file   Relationships     Social connections:     Talks on phone: Not on file     Gets together: Not on file     Attends Rastafari service: Not on file     Active member of club or organization: Not on file     Attends meetings of clubs or organizations: Not on file     Relationship status: Not on file     Intimate partner violence:     Fear of current or ex partner: Not on file     Emotionally abused: Not on file     Physically abused: Not on file     Forced sexual activity: Not on file   Other Topics Concern     Not on file   Social History Narrative     Not on file       Patient Care Team:  Nina Orellana MD as PCP - General (Family Medicine)  Jason Trejo MD as Assigned PCP          Objective:     Vitals:    02/03/20 1521  "  BP: 117/76   Pulse: 66   SpO2: 97%   Weight: 116 lb (52.6 kg)   Height: 5' 4\" (1.626 m)         Physical Exam:  General Appearance: healthy, alert, oriented and no distress  HEENT Exam: Oropharynx clear without lesion or exudate  Neck:  supple, no adenopathy, thyroid normal  Heart: Normal heart sounds, S1 and S2, No murmurs.  No carotid bruit noted.  No edema of extremities.  Lungs: Normal breath sounds, Clear to auscultation bilateral  Skin exam: No visible or palpable abnormalities  Neurological exam: gait normal, alert and oriented X 3  Hem/Lymph/Immuno exam: negative findings:  no cervical nodes, no supraclavicular nodes,       Patient Instructions      Before Your Surgery       Call your surgeon if there is any change in your health. This includes signs of a cold or flu (such as a sore throat, runny nose, cough, rash or fever).       Do not smoke, drink alcohol or take over the counter medicine (unless your surgeon or primary care doctor tells you to) for the 24 hours before and after surgery.       If you take prescribed drugs: Follow your doctor s orders about which medicines to take and which to stop until after surgery.      Eating and drinking prior to surgery: follow the instructions from your surgeon.      Take a shower or bath the night before surgery. Use the soap your surgeon gave you to gently clean your skin. If you do not have soap from your surgeon, use your regular soap. Do not shave or scrub the surgery site. Wear clean pajamas and have clean sheets on your bed.             Hold all supplements, aspirin and NSAIDs for 7 days prior to surgery.    Follow your surgeon's direction on when to stop eating and drinking prior to surgery.    OK to take Levothyroxine as before in the morning    Your surgeon will be managing your pain after your surgery.      Remove all jewelry and metal piercings before your surgery.     Remove nail polish from fingers before surgery.      EKG:  Not " needed    Labs:  Labs pending at this time.  Results will be reviewed when available.     Lab Review   Office Visit on 10/23/2019   Component Date Value     TSH 10/23/2019 0.69      Sodium 10/23/2019 140      Potassium 10/23/2019 4.2      Chloride 10/23/2019 107      CO2 10/23/2019 28      Anion Gap, Calculation 10/23/2019 5      Glucose 10/23/2019 78      BUN 10/23/2019 13      Creatinine 10/23/2019 0.68      GFR MDRD Af Amer 10/23/2019 >60      GFR MDRD Non Af Amer 10/23/2019 >60      Bilirubin, Total 10/23/2019 0.9      Calcium 10/23/2019 9.6      Protein, Total 10/23/2019 6.8      Albumin 10/23/2019 4.0      Alkaline Phosphatase 10/23/2019 92      AST 10/23/2019 21      ALT 10/23/2019 29      WBC 10/23/2019 6.0      RBC 10/23/2019 4.31      Hemoglobin 10/23/2019 14.0      Hematocrit 10/23/2019 40.9      MCV 10/23/2019 95      MCH 10/23/2019 32.5      MCHC 10/23/2019 34.3      RDW 10/23/2019 12.0      Platelets 10/23/2019 264      MPV 10/23/2019 7.7           Immunization History   Administered Date(s) Administered     Influenza, seasonal,quad inj 6-35 mos 12/04/2012, 10/15/2013     PPD Test 11/01/2017, 11/10/2017     Td,adult,historic,unspecified 06/29/2006     Tdap 10/15/2013           Electronically signed by Nina Orellana MD 02/03/20 3:19 PM

## 2021-06-10 NOTE — PROGRESS NOTES
[facility] Follow Up Note    Jane Weldon   61 y.o. female    Date of Visit: 5/26/2017    Chief Complaint   Patient presents with     Follow-up     thyoid     Subjective  This is a 61-year-old lady who does not come in very often.  She does have a history of hypothyroidism.  On her own she has been adjusting her thyroid dose a little bit but is currently taking her regular dose 6 days out of 7.  She has had some fatigue that seems to be worse than usual.  However, she has been under a great deal of stress over the past year.  Her  left her.  She does not have a current job.  She is considering going on Social Security.  She is going to be needing to sell her home.  With all of this she has been seeing a therapist on a regular basis and does have some good family support.  She does tell me that there are bad days but she is trying her best.  She did stop smoking several months ago and so far is doing reasonably well.  She does have migraine issues and has about 2 episodes per month.  For these she uses ibuprofen and sometimes it is helpful and sometimes it is not.  She reports no other new problems.    ROS A comprehensive review of systems was performed and was otherwise negative    Medications, allergies, and problem list were reviewed and updated    Exam  General Appearance:   On examination her blood pressure is 122/60.  Weight is 128 pounds and height is 64.5 inches.  BMI is 21.63.    Neck is supple with no masses and no neck vein distention.  No thyroid enlargement.    Heart is in a sinus rhythm with a rate of 70 and no ectopy.    No peripheral edema.    The patient is alert and oriented ×3.  Mood and affect today actually seem very good.      Assessment/Plan  1. Hypothyroidism, unspecified type  Thyroid Stimulating Hormone (TSH)   2. Anxiety     3. Depression     4. Migraines     5. Breast cancer screening  Mammo Screening Bilateral ACS     Hypothyroid.  We will check a TSH  today and make recommendations regarding dosing.    Migraines.  We did discuss this at length.  I told her that if migraines are worsening or if the ibuprofen is not working we can try something different such as Imitrex.  She will watch for now and let me know if she thinks she needs a different medication.    Ongoing depression and anxiety.  I encouraged her to keep on with her therapist and medications.  She actually seems to be doing remarkably well under the circumstances.    We will order a routine screening mammogram.    Total time of this office visit was 25 minutes with greater than 50% of the time spent in care coordination and patient counseling.      Thaddeus Gomes MD      Current Outpatient Prescriptions on File Prior to Visit   Medication Sig     ascorbic acid (VITAMIN C) Powd Take by mouth daily. 1/2 tsp 2000mg     calcium carbonate-vit D3-min (CALCIUM-VITAMIN D) 600 mg calcium- 400 unit Tab Take 1 tablet by mouth daily.      CHOLECALCIFEROL, VITAMIN D3, (VITAMIN D3 ORAL) Take 5,000 Units by mouth daily.     levothyroxine (SYNTHROID, LEVOTHROID) 100 MCG tablet TAKE 1 TABLET BY MOUTH DAILY     OM-3/E/LINOL/ALA/OLEIC/GLA/LIP (OMEGA 3-6-9 ORAL) Take 1 capsule by mouth 3 (three) times a day.     No current facility-administered medications on file prior to visit.      No Known Allergies  Social History   Substance Use Topics     Smoking status: Former Smoker     Packs/day: 0.50     Years: 40.00     Types: Cigarettes     Smokeless tobacco: Never Used     Alcohol use Yes      Comment: occasional wine

## 2021-06-14 NOTE — PROGRESS NOTES
Assessment:     1. Dystrophic nail  Hepatic Profile    terbinafine HCL (LAMISIL) 250 mg tablet   2. Hypothyroidism, unspecified type  levothyroxine (SYNTHROID, LEVOTHROID) 100 MCG tablet    Thyroid Cascade    Lipid [place] FASTING    Hepatic Profile    Glucose     Medical decision making: Thyroid labs as above.  Discussed and doing culture of nail clippings and then offering treatment versus empiric treatment.  Patient would like empiric treatment and terbinafine as prescribed.  Discussed liver function.  Previous normal liver function.  Advise recheck in 4 weeks.    Emphasized on health maintenance including need for Pap smear, mammogram, colonoscopy DEXA scan, pneumonia vaccine.  She defers for now.    Patient willing to pursue lipid panel when fasting and this has been ordered for future lab for her.  Plan:      The diagnosis was discussed with the patient and evaluation and treatment plans outlined.  Follow up: Return in about 4 weeks (around 2/9/2021) for come for lab visit.     Subjective:      Jane Weldon is a  female who presents for evaluation of   Chief Complaint   Patient presents with     Medication Management     Medication check and refill request of Levothyroxine     Does not want to pursue any health maintenance today.  Denies any symptoms of low thyroid.  Denies any hair fall, weight gain, fatigue or any other concerns.  Has had left foot middle toe nail that has been thickened and now feels that the fungal infection is expanding to the fourth toe.  Would like antifungal treatment      The following portions of the patient's history were reviewed and updated as appropriate: allergies, current medications, past family history, past medical history, past social history, past surgical history and problem list.    Review of Systems  Constitutional: negative  Cardiovascular: negative  Endocrine: negative       Objective:   /67 (Patient Site: Left Arm, Patient Position: Sitting, Cuff Size:  Adult Regular)   Pulse 67   Resp 16   Wt 163 lb (73.9 kg)   SpO2 98%   BMI 27.98 kg/m      GENERAL: Healthy, alert and no distress  EYES: Eyes grossly normal to inspection. No discharge or erythema, or obvious scleral/conjunctival abnormalities.  RESP: No audible wheeze, cough, or visible cyanosis.  No visible retractions or increased work of breathing.    MS: Left foot exam shows thickened dystrophic nail of third and fourth toe.  NEURO: Cranial nerves grossly intact. Mentation and speech appropriate for age.  PSYCH: Mentation appears normal, affect normal/bright, judgement and insight intact, normal speech and appearance well-groomed

## 2021-06-15 NOTE — TELEPHONE ENCOUNTER
At this time further 6 weeks medication is dispensed and I would like her to make a clinic appointment for recheck nails.    If there is no benefit, medication should be discontinued.

## 2021-06-16 PROBLEM — Z72.0 NICOTINE ABUSE: Status: ACTIVE | Noted: 2018-03-08

## 2021-06-16 NOTE — PROGRESS NOTES
DIAGNOSIS:  1. Fatigue  Thyroid Cascade    HM1(CBC and Differential)    Comprehensive Metabolic Panel    Vitamin D, Total (25-Hydroxy)    Urinalysis-UC if Indicated    HM1 (CBC with Diff)   2. Myalgia  Sedimentation Rate   3. Breast cancer screening     4. Nicotine abuse  XR Chest 2 Views   5. Chest discomfort  XR Chest 2 Views       PLAN:  Patient Instructions   LABS ARE PENDING    FOLLOW UP APPT    SCREENING MAMMOGRAM    IF ALL LABS ARE NORMAL AND MAMMOGRAM IS NORMAL THEN WOULD CONSIDER AN ECHO IF FATIGUE IS PERSISTING.              HPI:  Very tired in the past few weeks    Having to sleep a lot.    Yesterday couldn't get out of bed.   Muscles sore sometimes.   Under a lot of stress in her personal life.  No weight loss. Appetite lately not as great.  Crying a lot since  left and was left with the house.  Has been some time but is catching up with her.    Often feels like suffocating in clients homes where the temp is warm.  Mild chest discomfort all the time at least month,  Feels like a sore mild pain. Worse with a deep breath.  No exertional chest pain.  No sob.  Bowels normal.  Voiding ok.  menopause at age 50 and no spotting.  Arms feels achy and sore  occas calfs are sore    No recent sore throat            Current Outpatient Prescriptions on File Prior to Visit   Medication Sig Dispense Refill     ascorbic acid (VITAMIN C) Powd Take by mouth daily. 1/2 tsp 2000mg       calcium carbonate-vit D3-min (CALCIUM-VITAMIN D) 600 mg calcium- 400 unit Tab Take 1 tablet by mouth daily.        CHOLECALCIFEROL, VITAMIN D3, (VITAMIN D3 ORAL) Take 5,000 Units by mouth daily.       escitalopram oxalate (LEXAPRO) 10 MG tablet   1     escitalopram oxalate (LEXAPRO) 5 MG tablet TK 1 T PO  QD ALONG WITH A 10MG T  1     levothyroxine (SYNTHROID, LEVOTHROID) 100 MCG tablet TAKE 1 TABLET BY MOUTH DAILY 90 tablet 3     OM-3/E/LINOL/ALA/OLEIC/GLA/LIP (OMEGA 3-6-9 ORAL) Take 1 capsule by mouth 3 (three) times a day.        [DISCONTINUED] buPROPion (WELLBUTRIN XL) 150 MG 24 hr tablet TK 1 T PO D WF FOR MOOD  1     [DISCONTINUED] levothyroxine (SYNTHROID, LEVOTHROID) 100 MCG tablet TAKE 1 TABLET BY MOUTH DAILY 90 tablet 0     No current facility-administered medications on file prior to visit.        Pmh: reviewed  Psh: reviewed  Allergy:  reviewed      EXAM:    /60 (Patient Site: Right Arm, Patient Position: Sitting, Cuff Size: Adult Regular)  Pulse 62  Temp 98.3  F (36.8  C) (Oral)   Resp 16  Wt 128 lb 1 oz (58.1 kg)  BMI 21.64 kg/m2  GEN:   ALERT, NAD, ORIENTED TIMES THREE  HEENT: TMS NL, PERRL, THR CLEAR  NECK: SUPPLE WITHOUT ADENOPATHY OR THYROMEGALY  LUNGS: CTA  COR: RRR WITHOUT MURMUR  ABD: SOFT, +BS, NONTX WITHOUT GUARDING OR PERITONEAL SIGNS  SKIN: NO RASH , ULCERS OR LESIONS NOTED  EXT: WITHOUT EDEMA OR SWELLING    Recent Results (from the past 168 hour(s))   HM1 (CBC with Diff)    Collection Time: 03/08/18 10:06 AM   Result Value Ref Range    WBC 5.9 4.0 - 11.0 thou/uL    RBC 4.52 3.80 - 5.40 mill/uL    Hemoglobin 14.5 12.0 - 16.0 g/dL    Hematocrit 41.5 35.0 - 47.0 %    MCV 92 80 - 100 fL    MCH 32.0 27.0 - 34.0 pg    MCHC 34.9 32.0 - 36.0 g/dL    RDW 11.7 11.0 - 14.5 %    Platelets 263 140 - 440 thou/uL    MPV 7.6 7.0 - 10.0 fL    Neutrophils % 45 (L) 50 - 70 %    Lymphocytes % 42 (H) 20 - 40 %    Monocytes % 7 2 - 10 %    Eosinophils % 6 0 - 6 %    Basophils % 1 0 - 2 %    Neutrophils Absolute 2.7 2.0 - 7.7 thou/uL    Lymphocytes Absolute 2.5 0.8 - 4.4 thou/uL    Monocytes Absolute 0.4 0.0 - 0.9 thou/uL    Eosinophils Absolute 0.3 0.0 - 0.4 thou/uL    Basophils Absolute 0.0 0.0 - 0.2 thou/uL   Urinalysis-UC if Indicated    Collection Time: 03/08/18 10:25 AM   Result Value Ref Range    Color, UA Yellow Colorless, Yellow, Straw, Light Yellow    Clarity, UA Slightly Cloudy (!) Clear    Glucose, UA Negative Negative    Bilirubin, UA Negative Negative    Ketones, UA Negative Negative    Specific Gravity, UA 1.015  1.005 - 1.030    Blood, UA Negative Negative    pH, UA 7.5 5.0 - 8.0    Protein, UA Trace (!) Negative mg/dL    Urobilinogen, UA 0.2 E.U./dL 0.2 E.U./dL, 1.0 E.U./dL    Nitrite, UA Negative Negative    Leukocytes, UA Negative Negative    Bacteria, UA Few (!) None Seen hpf    RBC, UA None Seen None Seen, 0-2 hpf    WBC, UA None Seen None Seen, 0-5 hpf    Squam Epithel, UA 0-5 None Seen, 0-5 lpf    Amorphous, UA Few (!) None Seen     Lab Results   Component Value Date    TSH 0.97 09/06/2017    R9NZRZD 81 05/30/2012     Lab Results   Component Value Date    WBC 5.9 03/08/2018    HGB 14.5 03/08/2018    HCT 41.5 03/08/2018    MCV 92 03/08/2018     03/08/2018     Results for orders placed or performed in visit on 10/18/10   Comprehensive Metabolic Panel   Result Value Ref Range    Anion Gap, Calculation 10 5 - 15 mmol/L    A/G Ratio 1.3 g/dL    Albumin 4.0 3.4 - 5.0 g/dL    Alkaline Phosphatase 96 50 - 136 U/L    ALT 56 30 - 65 U/L    AST 22 15 - 37 U/L    BUN 14 8 - 22 mg/dL    Calcium 9.7 8.5 - 10.1 mg/dL    CO2 26 21 - 32 mmol/L    Chloride 104 98 - 107 mmol/L    Creatinine 0.7 0.6 - 1.3 mg/dL    Glucose 83 74 - 106 mg/dL    Globulin 3.0 1.5 - 3.5 g/dL    Potassium 4.7 3.5 - 5.1 mmol/L    Sodium 140 136 - 145 mmol/L    Bilirubin, Total 1.00 <1.01 mg/dL    Protein, Total 7.0 6.4 - 8.2 g/dL    GFR MDRD Non Af Amer >60 >60 ml/min/1.73m2    GFR MDRD Af Amer >60 >60 ml/min/1.73m2     Results for orders placed or performed during the hospital encounter of 11/29/16   Basic Metabolic Panel   Result Value Ref Range    Sodium 138 136 - 145 mmol/L    Potassium 4.3 3.5 - 5.0 mmol/L    Chloride 105 98 - 107 mmol/L    CO2 26 22 - 31 mmol/L    Anion Gap, Calculation 7 5 - 18 mmol/L    Glucose 99 70 - 125 mg/dL    Calcium 9.3 8.5 - 10.5 mg/dL    BUN 12 8 - 22 mg/dL    Creatinine 0.75 0.60 - 1.10 mg/dL    GFR MDRD Af Amer >60 >60 mL/min/1.73m2    GFR MDRD Non Af Amer >60 >60 mL/min/1.73m2     Lab Results   Component Value Date     COLORU Yellow 03/08/2018    CLARITYU Slightly Cloudy (!) 03/08/2018    GLUCOSEU Negative 03/08/2018    BILIRUBINUR Negative 03/08/2018    KETONESU Negative 03/08/2018    SPECGRAV 1.015 03/08/2018    HGBUA Negative 03/08/2018    PHUR 7.5 03/08/2018    PROTEINUA Trace (!) 03/08/2018    UROBILINOGEN 0.2 E.U./dL 03/08/2018    NITRITE Negative 03/08/2018    LEUKOCYTESUR Negative 03/08/2018    BACTERIA Few (!) 03/08/2018    RBCUA None Seen 03/08/2018    WBCUA None Seen 03/08/2018    SQUAMEPI 0-5 03/08/2018     Lab Results   Component Value Date    WBC 5.9 03/08/2018    HGB 14.5 03/08/2018    HCT 41.5 03/08/2018    MCV 92 03/08/2018     03/08/2018       CXR:  NO INFILTRATE OR MASS

## 2021-06-17 NOTE — TELEPHONE ENCOUNTER
Incoming call from pt requesting refill. Rx pended. Pt said she had labs done a couple weeks ago for liver function to see if she can continue on med. Pt only has one pill left.

## 2021-06-18 NOTE — PATIENT INSTRUCTIONS - HE
Patient Instructions by Nina Orellana MD at 1/12/2021  1:30 PM     Author: Nina Orellana MD Service: -- Author Type: Physician    Filed: 1/12/2021  1:58 PM Encounter Date: 1/12/2021 Status: Signed    : Nina Orellana MD (Physician)       Patient Education     Nail Fungal Infection  A nail fungal infection changes the way fingernails and toenails look. They may thicken, discolor, change shape, or split. This condition is hard to treat because nails grow slowly and have limited blood supply. The infection often comes back after treatment.  There are 2 types of medicines used to treat this condition:    Topical anti-fungal medicines. These are applied to the surface of the skin and nail area. These medicines are not very effective because they cant get deep into the nail.    Oral antifungal medicines. These medicines work better because they go into the nail from the inside out. But the infection may still come back. It may take 9 to 12 months for your nail to look normal again. This means you are cured. You can repeat treatment if needed. Most people take these medicines without any problems. It is rare to stop therapy because of side effects. But your healthcare provider may give you some monitoring tests. Talk about possible side effects with your provider before starting treatment.  If medicines fail, the nail can be removed surgically or chemically. These methods physically remove the fungus from the body. This helps medical treatment be more effective.  Home care    Use medicines exactly as directed for as long as directed. Treating a fungal infection can take longer than other kinds of infections.    Smoking is a risk factor for fungal infection. This is one more reason to quit.    Wear absorbent socks, and shoes that let your feet breathe. Sweaty feet increase your risk of fungal infection. They also make an existing infection harder to treat.    Use footwear when in damp public places like  swimming pools, gyms, and shower rooms. This will help you avoid the fungus that grows there.    Don't share nail clippers or scissors with others.  Follow-up care  Follow up with your healthcare provider, or as advised.  When to seek medical advice  Call your healthcare provider right away if any of these occur:    Skin by the nail becomes red, swollen, painful, or drains pus (a creamy yellow or white liquid)    Side effects from oral anti-fungal medicines  Date Last Reviewed: 8/1/2016 2000-2017 [employer]. [address] [place] [address]. All rights reserved. This information is not intended as a substitute for professional medical care. Always follow your healthcare professional's instructions.

## 2021-06-26 ENCOUNTER — HEALTH MAINTENANCE LETTER (OUTPATIENT)
Age: 66
End: 2021-06-26

## 2021-07-03 NOTE — ADDENDUM NOTE
Addendum Note by Kathy Silva MA at 3/8/2018 10:58 AM     Author: Kathy Silva MA Service: -- Author Type: Certified Medical Assistant    Filed: 3/8/2018 10:58 AM Encounter Date: 3/8/2018 Status: Signed    : Kathy Silva MA (Certified Medical Assistant)    Addended by: KATHY SILVA on: 3/8/2018 10:58 AM        Modules accepted: Orders

## 2021-07-03 NOTE — ADDENDUM NOTE
Addendum Note by Natasha Trejo MD at 3/12/2018  9:19 AM     Author: Natasha Trejo MD Service: -- Author Type: Physician    Filed: 3/12/2018  9:19 AM Encounter Date: 3/8/2018 Status: Signed    : Natasha Trejo MD (Physician)    Addended by: NATASHA TREJO on: 3/12/2018 09:19 AM        Modules accepted: Orders

## 2021-07-13 ENCOUNTER — RECORDS - HEALTHEAST (OUTPATIENT)
Dept: ADMINISTRATIVE | Facility: CLINIC | Age: 66
End: 2021-07-13

## 2021-09-05 ENCOUNTER — HOSPITAL ENCOUNTER (EMERGENCY)
Facility: HOSPITAL | Age: 66
Discharge: HOME | End: 2021-09-05
Attending: EMERGENCY MEDICINE | Admitting: EMERGENCY MEDICINE
Payer: COMMERCIAL

## 2021-09-05 VITALS
BODY MASS INDEX: 20.11 KG/M2 | HEART RATE: 74 BPM | SYSTOLIC BLOOD PRESSURE: 132 MMHG | TEMPERATURE: 97.8 F | HEIGHT: 64 IN | RESPIRATION RATE: 18 BRPM | OXYGEN SATURATION: 97 % | WEIGHT: 117.8 LBS | DIASTOLIC BLOOD PRESSURE: 76 MMHG

## 2021-09-05 DIAGNOSIS — J06.9 UPPER RESPIRATORY TRACT INFECTION, UNSPECIFIED TYPE: ICD-10-CM

## 2021-09-05 DIAGNOSIS — G44.209 TENSION HEADACHE: ICD-10-CM

## 2021-09-05 DIAGNOSIS — B34.9 VIRAL SYNDROME: ICD-10-CM

## 2021-09-05 DIAGNOSIS — R52 BODY ACHES: ICD-10-CM

## 2021-09-05 LAB — SARS-COV-2 RNA RESP QL NAA+PROBE: NEGATIVE

## 2021-09-05 PROCEDURE — 96372 THER/PROPH/DIAG INJ SC/IM: CPT | Performed by: EMERGENCY MEDICINE

## 2021-09-05 PROCEDURE — 87635 SARS-COV-2 COVID-19 AMP PRB: CPT | Performed by: EMERGENCY MEDICINE

## 2021-09-05 PROCEDURE — 250N000011 HC RX IP 250 OP 636: Performed by: EMERGENCY MEDICINE

## 2021-09-05 PROCEDURE — C9803 HOPD COVID-19 SPEC COLLECT: HCPCS

## 2021-09-05 PROCEDURE — 99284 EMERGENCY DEPT VISIT MOD MDM: CPT

## 2021-09-05 RX ORDER — KETOROLAC TROMETHAMINE 30 MG/ML
30 INJECTION, SOLUTION INTRAMUSCULAR; INTRAVENOUS ONCE
Status: COMPLETED | OUTPATIENT
Start: 2021-09-05 | End: 2021-09-05

## 2021-09-05 RX ORDER — METOCLOPRAMIDE HYDROCHLORIDE 5 MG/ML
10 INJECTION INTRAMUSCULAR; INTRAVENOUS ONCE
Status: COMPLETED | OUTPATIENT
Start: 2021-09-05 | End: 2021-09-05

## 2021-09-05 RX ORDER — DIPHENHYDRAMINE HYDROCHLORIDE 50 MG/ML
25 INJECTION INTRAMUSCULAR; INTRAVENOUS ONCE
Status: COMPLETED | OUTPATIENT
Start: 2021-09-05 | End: 2021-09-05

## 2021-09-05 RX ADMIN — KETOROLAC TROMETHAMINE 30 MG: 30 INJECTION, SOLUTION INTRAMUSCULAR; INTRAVENOUS at 22:42

## 2021-09-05 RX ADMIN — METOCLOPRAMIDE HYDROCHLORIDE 10 MG: 5 INJECTION INTRAMUSCULAR; INTRAVENOUS at 22:42

## 2021-09-05 RX ADMIN — DIPHENHYDRAMINE HYDROCHLORIDE 25 MG: 50 INJECTION, SOLUTION INTRAMUSCULAR; INTRAVENOUS at 22:46

## 2021-09-05 ASSESSMENT — MIFFLIN-ST. JEOR: SCORE: 1074.34

## 2021-09-06 NOTE — ED TRIAGE NOTES
Pt. Stated that she had her first covid shot about 28 days ago and yesterday she began having general body aches, weakness, loss of taste and smell, feeling feverish.

## 2021-09-06 NOTE — ED PROVIDER NOTES
EMERGENCY DEPARTMENT ENCOUNTER      NAME: Jane Weldon  AGE: 65 year old female  YOB: 1955  MRN: [id number]  EVALUATION DATE & TIME: 9/5/2021 10:10 PM    PCP: Nina Orellana    ED PROVIDER: Winnie Aaron M.D.      Chief Complaint   Patient presents with     Fever     Generalized Body Aches     FINAL IMPRESSION:  1. Upper respiratory tract infection, unspecified type    2. Viral syndrome    3. Body aches    4. Tension headache      ED COURSE & MEDICAL DECISION MAKING:    Pertinent Labs & Imaging studies reviewed. (See chart for details)    10:15 PM Met with patient for initial interview and exam. Plan for care in the ED was discussed. I saw the patient wearing an N95, surgical cap, and gloves.    ED Course as of Sep 05 2349   Sun Sep 05, 2021   2228 Patient is 65-year-old female who comes in today with fever and body aches and headache and just not feeling well.  She said one of the 2 Covid vaccines.  We did test her for Covid and it just came back negative.  She is not aware of the result yet.  She really wants symptomatic management.  She had a little nausea with a headache.  She has a history of migraines though this does not feel like a migraine.  This feels like a typical headache she gets when she is not feeling well and has a fever.  I offered her IV pain medications versus IM versus oral.  She like to do the IM.  I ordered Reglan, Toradol, Benadryl to help with her symptoms.  She is been drinking plenty of fluids just not eating well today.  I think she likely has some kind of URI or a false negative Covid test.  I will discussed the results with her once we treat her symptoms and then hopefully we can get her discharged home.  She is nontoxic-appearing.  She has good range of motion of her neck and I do not think this represents meningitis.  Lung sounds are clear bilaterally so I do not think this is pneumonia despite the fact that she has a cough.      1956 I discussed the results  with the patient.  She is in agreement with going home.  She does not want to wait for the meds to kick in.  She rather just go now.  We discussed the negative Covid results and the need to stay away from others since she either has Covid or some other respiratory illness.  She agrees with the plan.  She was discharged in stable condition.          At the conclusion of the encounter I discussed  the results of all of the tests and the disposition with patient.   All questions were answered.  The patient acknowledged understanding and was involved in the decision making regarding the overall care plan.      I discussed with patient the utility, limitations and findings of the exam/interventions/studies done during this visit as well as the list of differential diagnosis and symptoms to monitor/return to ER for.  Additional verbal discharge instructions were provided.     MEDICATIONS GIVEN IN THE EMERGENCY:  Medications   ketorolac (TORADOL) injection 30 mg (30 mg Intramuscular Given 9/5/21 2242)   metoclopramide (REGLAN) injection 10 mg (10 mg Intramuscular Given 9/5/21 2242)   diphenhydrAMINE (BENADRYL) injection 25 mg (25 mg Intramuscular Given 9/5/21 2246)       NEW PRESCRIPTIONS STARTED AT TODAY'S ER VISIT  Discharge Medication List as of 9/5/2021 11:17 PM             =================================================================    HPI    Triage Note: Pt. Stated that she had her first covid shot about 28 days ago and yesterday she began having general body aches, weakness, loss of taste and smell, feeling feverish.       Patient information was obtained from: Patient    Use of : N/A        Jane Weldon is a 65 year old female who presents with body aches, feeling feverish, headache, nausea, slight cough.  She is had one of her 2 Covid vaccines.  She is due the for the next 1 shortly.  She denies any sick contacts.  She is not currently working.  She had migraines before and this does not quite  feel like a migraine.  She had a migraine earlier this week.  She has been drinking fluids but not eating well.  She just feels really rundown.  She denies any abdominal pain or chest pain or shortness of breath.  She denies any diarrhea.  She denies a history of pneumonia.  She has no other complaints at this time.      REVIEW OF SYSTEMS   Except as stated in the HPI all other systems reviewed and are negative.    PAST MEDICAL HISTORY:  Past Medical History:   Diagnosis Date     Osteopenia        PAST SURGICAL HISTORY:  Past Surgical History:   Procedure Laterality Date     C APPENDECTOMY      Description: Appendectomy;  Recorded: 05/04/2009;     KS TREAT ECTOPIC PREG,RMV TUBE/OVARY      Description: Salpingectomy For Ectopic Pregnancy;  Recorded: 05/04/2009;       CURRENT MEDICATIONS:    No current facility-administered medications for this encounter.    Current Outpatient Medications:      ascorbic acid (VITAMIN C) Powd, [ASCORBIC ACID (VITAMIN C) POWD] Take by mouth daily. 1/2 tsp 2000mg, Disp: , Rfl:      calcium carbonate-vit D3-min (CALCIUM-VITAMIN D) 600 mg calcium- 400 unit Tab, [CALCIUM CARBONATE-VIT D3-MIN (CALCIUM-VITAMIN D) 600 MG CALCIUM- 400 UNIT TAB] Take 1 tablet by mouth daily. , Disp: , Rfl:      CHOLECALCIFEROL, VITAMIN D3, (VITAMIN D3 ORAL), [CHOLECALCIFEROL, VITAMIN D3, (VITAMIN D3 ORAL)] Take 5,000 Units by mouth daily., Disp: , Rfl:      escitalopram oxalate (LEXAPRO) 10 MG tablet, [ESCITALOPRAM OXALATE (LEXAPRO) 10 MG TABLET] , Disp: , Rfl: 1     escitalopram oxalate (LEXAPRO) 5 MG tablet, [ESCITALOPRAM OXALATE (LEXAPRO) 5 MG TABLET] TK 1 T PO  QD ALONG WITH A 10MG T, Disp: , Rfl: 1     levothyroxine (SYNTHROID, LEVOTHROID) 100 MCG tablet, [LEVOTHYROXINE (SYNTHROID, LEVOTHROID) 100 MCG TABLET] TAKE 1 TABLET(100 MCG) BY MOUTH DAILY, Disp: 90 tablet, Rfl: 1     zolpidem (AMBIEN) 5 MG tablet, [ZOLPIDEM (AMBIEN) 5 MG TABLET] Take 5 mg by mouth at bedtime as needed. for insomnia, Disp: , Rfl:      ALLERGIES:  No Known Allergies    FAMILY HISTORY:  No family history on file.    SOCIAL HISTORY:   Social History     Socioeconomic History     Marital status:      Spouse name: Not on file     Number of children: Not on file     Years of education: Not on file     Highest education level: Not on file   Occupational History     Not on file   Tobacco Use     Smoking status: Current Every Day Smoker     Packs/day: 0.50     Years: 40.00     Pack years: 20.00     Types: Cigarettes     Smokeless tobacco: Never Used   Substance and Sexual Activity     Alcohol use: Yes     Comment: Alcoholic Drinks/day: occasional wine     Drug use: No     Sexual activity: Not on file   Other Topics Concern     Not on file   Social History Narrative        The 10-year ASCVD risk score (Brandon OSBORNE Jr., et al., 2013) is: 5.6%    Values used to calculate the score:      Age: 65 years      Sex: Female      Is Non- : No      Diabetic: No      Tobacco smoker: Yes      Systolic Blood Pres sure: 106 mmHg      Is BP treated: No      HDL Cholesterol: 70 mg/dL      Total Cholesterol: 213 mg/dL       Social Determinants of Health     Financial Resource Strain:      Difficulty of Paying Living Expenses:    Food Insecurity:      Worried About Running Out of Food in the Last Year:      Ran Out of Food in the Last Year:    Transportation Needs:      Lack of Transportation (Medical):      Lack of Transportation (Non-Medical):    Physical Activity:      Days of Exercise per Week:      Minutes of Exercise per Session:    Stress:      Feeling of Stress :    Social Connections:      Frequency of Communication with Friends and Family:      Frequency of Social Gatherings with Friends and Family:      Attends Spiritism Services:      Active Member of Clubs or Organizations:      Attends Club or Organization Meetings:      Marital Status:    Intimate Partner Violence:      Fear of Current or Ex-Partner:      Emotionally Abused:   "    Physically Abused:      Sexually Abused:        PHYSICAL EXAM    VITAL SIGNS: /76   Pulse 74   Temp 97.8  F (36.6  C) (Oral)   Resp 18   Ht 1.626 m (5' 4\")   Wt 53.4 kg (117 lb 12.8 oz)   SpO2 97%   BMI 20.22 kg/m     GENERAL: Awake, Alert, answering questions, No acute distress, Well nourished   HEENT: Normal cephalic, Atraumatic, bilateral external ears normal, No scleral icterus, mask in place  NECK: No obvious swelling or abnormality, No stridor  PULMONARY:Normal and symmetric breath sounds, No respiratory distress, Lungs clear to auscultation bilaterally. No wheezing  CARDIOVASCULAR: Regular rate and rhythm, Distal pulses present and normal.  ABDOMINAL: Soft, Nondistended, Nontender, No flank tenderness, No palpable masses  BACK: No bruising or tenderness.  EXTREMITIES: Moves all extremities spontaneously, warm, no edema, No major deformities  NEURO: No facial droop, normal motor function, Normal speech   PSYCH: Normal mood and affect  SKIN: No rashes on visualized skin, dry, warm     LAB:  All pertinent labs reviewed and interpreted.  Results for orders placed or performed during the hospital encounter of 09/05/21   Symptomatic COVID-19 Virus (Coronavirus) by PCR Nasopharyngeal    Specimen: Nasopharyngeal; Swab   Result Value Ref Range    SARS CoV2 PCR Negative Negative     I, Mimi Chopra, am serving as a scribe to document services personally performed by Dr. Aaron based on my observation and the provider's statements to me. I, Winnie Aaron MD attest that Mimi Chopra is acting in a scribe capacity, has observed my performance of the services and has documented them in accordance with my direction.    Winnie Aaron M.D.  Emergency Medicine  [facility] EMERGENCY DEPARTMENT  [facility] [facility] [id number] [phone]  Dept: [phone]       Winnie Aaron MD  09/05/21 [id number]    "

## 2021-09-06 NOTE — DISCHARGE INSTRUCTIONS
You were seen in the Emergency Department today for evaluation of body aches and a headache and likely a viral syndrome.  Your lab work showed no Covid.   Follow up with your primary care physician to ensure resolution of symptoms. Return if you have new or worsening symptoms.

## 2021-10-14 DIAGNOSIS — E03.9 HYPOTHYROIDISM, UNSPECIFIED TYPE: ICD-10-CM

## 2021-10-15 RX ORDER — LEVOTHYROXINE SODIUM 100 UG/1
TABLET ORAL
Qty: 90 TABLET | Refills: 1 | Status: SHIPPED | OUTPATIENT
Start: 2021-10-15 | End: 2021-11-11

## 2021-10-16 ENCOUNTER — HEALTH MAINTENANCE LETTER (OUTPATIENT)
Age: 66
End: 2021-10-16

## 2021-11-11 ENCOUNTER — TELEPHONE (OUTPATIENT)
Dept: FAMILY MEDICINE | Facility: CLINIC | Age: 66
End: 2021-11-11

## 2021-11-11 ENCOUNTER — VIRTUAL VISIT (OUTPATIENT)
Dept: FAMILY MEDICINE | Facility: CLINIC | Age: 66
End: 2021-11-11
Payer: COMMERCIAL

## 2021-11-11 DIAGNOSIS — E03.9 HYPOTHYROIDISM, UNSPECIFIED TYPE: ICD-10-CM

## 2021-11-11 DIAGNOSIS — N94.10 PAIN IN FEMALE GENITALIA ON INTERCOURSE: Primary | ICD-10-CM

## 2021-11-11 DIAGNOSIS — Z72.0 NICOTINE ABUSE: ICD-10-CM

## 2021-11-11 PROBLEM — R49.0 HOARSENESS: Status: ACTIVE | Noted: 2021-11-11

## 2021-11-11 PROCEDURE — 99213 OFFICE O/P EST LOW 20 MIN: CPT | Mod: 95 | Performed by: FAMILY MEDICINE

## 2021-11-11 RX ORDER — LEVOTHYROXINE SODIUM 100 UG/1
TABLET ORAL
Qty: 90 TABLET | Refills: 1 | Status: SHIPPED | OUTPATIENT
Start: 2021-11-11 | End: 2022-10-06

## 2021-11-11 NOTE — TELEPHONE ENCOUNTER
Central Prior Authorization Team  Phone: [phone]    PA Initiation    Medication: PREMARIN 0.625 MG/GM vaginal cream  Insurance [facility] - Phone [phone] Fax [phone]  Pharmacy Filling the Rx: "[id number] [place], [place] - [address] [place] [address] E AT [place] [address] & [facility]  Filling Pharmacy Phone: [phone]  Filling Pharmacy Fax:    Start Date: 11/11/2021

## 2021-11-11 NOTE — PROGRESS NOTES
Jane is a 66 year old who is being evaluated via a billable telephone visit.      What phone number would you like to be contacted at? [phone]  How would you like to obtain your AVS? MyChart    Assessment & Plan     ICD-10-CM    1. Pain in female genitalia on intercourse  N94.10 conjugated estrogens (PREMARIN) 0.625 MG/GM vaginal cream   2. Hypothyroidism, unspecified type  E03.9 levothyroxine (SYNTHROID/LEVOTHROID) 100 MCG tablet   3. Nicotine abuse  Z72.0      Medication making: Lucila presents today for symptoms of vaginal dryness.  Discussed about need for pelvic exam to evaluate for tight introitus.  Need for possible vaginal dilators versus thinning of the vaginal mucosa for which have already prescribed her Premarin as above.    She is due for health maintenance and this is emphasized.  She is a current smoker and we discussed increased risk of cancers.  She does need to follow-up for colon cancer, mammogram and Pap smear screening.  She verbalizes understanding.      Tobacco Cessation:   reports that she has been smoking cigarettes. She has a 20.00 pack-year smoking history. She has never used smokeless tobacco.      MEDICATIONS:   Orders Placed This Encounter   Medications     levothyroxine (SYNTHROID/LEVOTHROID) 100 MCG tablet     Sig: [LEVOTHYROXINE (SYNTHROID, LEVOTHROID) 100 MCG TABLET] TAKE 1 TABLET(100 MCG) BY MOUTH DAILY     Dispense:  90 tablet     Refill:  1     conjugated estrogens (PREMARIN) 0.625 MG/GM vaginal cream     Sig: Place 0.5 g vaginally twice a week 0.5 g of cream intravaginally administered daily for 2 weeks, then reduce to twice weekly     Dispense:  10 g     Refill:  1          - Continue other medications without change  See Patient Instructions    Return in about 4 weeks (around 12/9/2021) for Routine preventive.    Nina Orellana MD  [facility]    Subjective    Chief Complaint   Patient presents with     Sexual Problem     Painful during  fausto Lara is a 66 year old who presents for the following health issues     HPI patient has resumed a relationship with a new partner. She was  4-1/2 years ago. The last couple of months, she has tried intercourse that has been painful. She has used OTC prep without benefit.  She has not had a recent pelvic exam.  She does not report discharge or bleeding.      Patient Active Problem List   Diagnosis     Allergic Rhinitis     Hoarseness     Insomnia     Lipoma Of The Adipose Tissue     Tingling (Paresthesia)     Hypothyroidism     Osteopenia     Symptomatic Menopause     Osteoporosis     Nocturia     Anxiety     Insomnia     Nicotine abuse     Hoarseness           Review of Systems   Constitutional, HEENT, cardiovascular, pulmonary, gi and gu systems are negative, except as otherwise noted.      Objective           Vitals:  No vitals were obtained today due to virtual visit.    Physical Exam   healthy, alert and no distress  PSYCH: Alert and oriented times 3; coherent speech, normal   rate and volume, able to articulate logical thoughts, able   to abstract reason, no tangential thoughts, no hallucinations   or delusions  Her affect is normal  RESP: No cough, no audible wheezing, able to talk in full sentences  Remainder of exam unable to be completed due to telephone visits          Phone call duration: 13 minutes

## 2021-11-11 NOTE — TELEPHONE ENCOUNTER
[address] [facility] [place] needed for Premarin vaginal cream 30gm    Phone:  [phone]  ID: [id number]

## 2021-11-15 ENCOUNTER — TELEPHONE (OUTPATIENT)
Dept: FAMILY MEDICINE | Facility: CLINIC | Age: 66
End: 2021-11-15
Payer: COMMERCIAL

## 2021-11-15 RX ORDER — ESTRADIOL 10 UG/1
10 INSERT VAGINAL
Qty: 18 TABLET | Refills: 1 | Status: SHIPPED | OUTPATIENT
Start: 2021-11-15 | End: 2021-12-14

## 2021-11-15 NOTE — TELEPHONE ENCOUNTER
Patient went to  her Premarin vaginal cream and her insurance will not pay for it.  The cream is $550 and the patient cannot afford that.  Pharmacy suggested patient call Dr. Orellana to see if there is another diagnosis that can be linked to the cream or if there is an alternative she could send in.  Patient uses [facility] on [address].  Please call patient at [phone] with next steps.

## 2021-11-15 NOTE — TELEPHONE ENCOUNTER
PRIOR AUTHORIZATION DENIED    Medication: PREMARIN 0.625 MG/GM vaginal cream- DENIED     Denial Date: 11/12/2021    Denial Rational: Patient must have a history of trial & failure to 3 formulary alternatives or have a contraindication or intolerance to the formulary alternatives:        Appeal Information: If provider would like to appeal please provide a letter of medical necessity.

## 2021-11-16 NOTE — TELEPHONE ENCOUNTER
I have sent prescription of Yuvafem to the pharmacy.  Please inform patient.    Nina Orellana MD

## 2021-12-14 DIAGNOSIS — N94.10 PAIN IN FEMALE GENITALIA ON INTERCOURSE: ICD-10-CM

## 2021-12-14 RX ORDER — ESTRADIOL 10 UG/1
10 INSERT VAGINAL
Qty: 18 TABLET | Refills: 1 | Status: SHIPPED | OUTPATIENT
Start: 2021-12-16 | End: 2022-04-27

## 2021-12-14 NOTE — TELEPHONE ENCOUNTER
Jane is calling regarding the recent script for Yuvafem.  She states that you wanted her to do follow up 1 month after starting.  She was unable to get in until 1/20/22.  She is wanting to know if you if it's ok to go beyond the 1 month?  Should she still continue using until she is able to be seen?     Please review and advise. Thanks!

## 2021-12-14 NOTE — TELEPHONE ENCOUNTER
Okay to use until I see her again    Nina Orellana MD     Pt was seen today given no medication dad is calling saying he wasn't antibiotic

## 2022-04-26 DIAGNOSIS — N94.10 PAIN IN FEMALE GENITALIA ON INTERCOURSE: ICD-10-CM

## 2022-04-27 RX ORDER — ESTRADIOL 10 UG/1
10 INSERT VAGINAL
Qty: 10 TABLET | Refills: 0 | Status: SHIPPED | OUTPATIENT
Start: 2022-04-28 | End: 2022-12-16

## 2022-04-27 NOTE — TELEPHONE ENCOUNTER
Please inform patient that she should follow-up for physical exam for ongoing refills..    Per my previous note      Discussed about need for pelvic exam to evaluate for tight introitus.  Need for possible vaginal dilators versus thinning of the vaginal mucosa for which have already prescribed her Premarin as above.  She is due for health maintenance and this is emphasized.  She is a current smoker and we discussed increased risk of cancers.  She does need to follow-up for colon cancer, mammogram and Pap smear screening.  She verbalizes understanding.

## 2022-07-23 ENCOUNTER — HEALTH MAINTENANCE LETTER (OUTPATIENT)
Age: 67
End: 2022-07-23

## 2022-08-19 ENCOUNTER — NURSE TRIAGE (OUTPATIENT)
Dept: NURSING | Facility: CLINIC | Age: 67
End: 2022-08-19

## 2022-08-19 ENCOUNTER — LAB (OUTPATIENT)
Dept: FAMILY MEDICINE | Facility: CLINIC | Age: 67
End: 2022-08-19
Payer: COMMERCIAL

## 2022-08-19 DIAGNOSIS — Z20.822 SUSPECTED COVID-19 VIRUS INFECTION: ICD-10-CM

## 2022-08-19 PROCEDURE — U0003 INFECTIOUS AGENT DETECTION BY NUCLEIC ACID (DNA OR RNA); SEVERE ACUTE RESPIRATORY SYNDROME CORONAVIRUS 2 (SARS-COV-2) (CORONAVIRUS DISEASE [COVID-19]), AMPLIFIED PROBE TECHNIQUE, MAKING USE OF HIGH THROUGHPUT TECHNOLOGIES AS DESCRIBED BY CMS-2020-01-R: HCPCS

## 2022-08-19 PROCEDURE — U0005 INFEC AGEN DETEC AMPLI PROBE: HCPCS

## 2022-08-19 NOTE — TELEPHONE ENCOUNTER
Pt calling for general covid information after scheduling an appointment to get tested for covid at 2:15 PM today     Symptoms started on Wednesday and include a headache, tactile fever, muscle aches, loss of appetite, mild nausea, and coughing     Reviewed symptoms to monitor for and home care advice with pt who is agreeable and verbalizes understanding        Reason for Disposition    [1] COVID-19 infection suspected by caller or triager AND [2] mild symptoms (cough, fever, or others) AND [3] has not gotten tested yet    Additional Information    Negative: [1] Diagnosed or suspected COVID-19 AND [2] symptoms lasting 3 or more weeks    Negative: [1] COVID-19 exposure AND [2] no symptoms    Negative: COVID-19 vaccine reaction suspected (e.g., fever, headache, muscle aches) occurring 1 to 3 days after getting vaccine    Negative: COVID-19 vaccine, questions about    Negative: [1] Lives with someone known to have influenza (flu test positive) AND [2] flu-like symptoms (e.g., cough, runny nose, sore throat, SOB; with or without fever)    Negative: [1] Adult with possible COVID-19 symptoms AND [2] triager concerned about severity of symptoms or other causes    Negative: COVID-19 and breastfeeding, questions about    Negative: SEVERE difficulty breathing (e.g., struggling for each breath, speaks in single words)    Negative: Difficult to awaken or acting confused (e.g., disoriented, slurred speech)    Negative: Bluish (or gray) lips or face now    Negative: Shock suspected (e.g., cold/pale/clammy skin, too weak to stand, low BP, rapid pulse)    Negative: Sounds like a life-threatening emergency to the triager    Negative: Chest pain or pressure  (Exception: MILD central chest pain, present only when coughing)    Negative: Patient sounds very sick or weak to the triager    Negative: MILD difficulty breathing (e.g., minimal/no SOB at rest, SOB with walking, pulse <100)    Negative: Fever > 103 F (39.4 C)    Negative: [1]  Fever > 101 F (38.3 C) AND [2] over 60 years of age    Negative: [1] Fever > 100.0 F (37.8 C) AND [2] bedridden (e.g., nursing home patient, CVA, chronic illness, recovering from surgery)    Negative: [1] HIGH RISK for severe COVID complications (e.g., weak immune system, age > 64 years, obesity with BMI of 30 or higher, pregnant, chronic lung disease or other chronic medical condition) AND [2] COVID symptoms (e.g., cough, fever)  (Exceptions: Already seen by PCP and no new or worsening symptoms.)    Negative: [1] HIGH RISK patient AND [2] influenza is widespread in the community AND [3] ONE OR MORE respiratory symptoms: cough, sore throat, runny or stuffy nose    Negative: [1] HIGH RISK patient AND [2] influenza exposure within the last 7 days AND [3] ONE OR MORE respiratory symptoms: cough, sore throat, runny or stuffy nose    Negative: Oxygen level (e.g., pulse oximetry) 91 to 94 percent    Negative: SEVERE or constant chest pain or pressure  (Exception: Mild central chest pain, present only when coughing.)    Negative: MODERATE difficulty breathing (e.g., speaks in phrases, SOB even at rest, pulse 100-120)    Negative: Headache and stiff neck (can't touch chin to chest)    Negative: Oxygen level (e.g., pulse oximetry) 90 percent or lower    Negative: [1] COVID-19 infection suspected by caller or triager AND [2] mild symptoms (cough, fever, or others) AND [3] negative COVID-19 rapid test    Negative: Fever present > 3 days (72 hours)    Negative: [1] Fever returns after gone for over 24 hours AND [2] symptoms worse or not improved    Negative: [1] Continuous (nonstop) coughing interferes with work or school AND [2] no improvement using cough treatment per Care Advice    Negative: Cough present > 3 weeks    Protocols used: CORONAVIRUS (COVID-19) DIAGNOSED OR [id number]      [place]ris [place] RN [facility] August 19, 2022 1:02 PM

## 2022-08-20 ENCOUNTER — TELEPHONE (OUTPATIENT)
Dept: NURSING | Facility: CLINIC | Age: 67
End: 2022-08-20

## 2022-08-20 LAB — SARS-COV-2 RNA RESP QL NAA+PROBE: POSITIVE

## 2022-08-20 NOTE — TELEPHONE ENCOUNTER
"Patient called    oronavirus (COVID-19) Notification    Caller Name (Patient, parent, daughter/son, grandparent, etc)  Self    Reason for call  Notify of Positive Coronavirus (COVID-19) lab results, assess symptoms,  review [facility] recommendations    Lab Result    Lab test:  2019-nCoV rRt-PCR or SARS-CoV-2 PCR    Oropharyngeal AND/OR nasopharyngeal swabs is POSITIVE for 2019-nCoV RNA/SARS-COV-2 PCR (COVID-19 virus)    Brief introduction script  Introduce self then review script:  \"I am calling on behalf of [employer].  We were notified that your Coronavirus test (COVID-19) for was POSITIVE for the virus.\"    Gather patient reported symptoms   Assessment   Current Symptoms at time of phone call, reported by patient: (if no symptoms, document No symptoms] Didn't get   Date of Symptom(s) onset (if applicable) 8/17/22     If at time of call, Patients symptoms hare worsened, the Patient should contact 911 or have someone drive them to Emergency Dept promptly:      If Patient calling 911, inform 911 personal that you have tested positive for the Coronavirus (COVID-19).  Place mask on and await 911 to arrive.    If Emergency Dept, If possible, please have another adult drive you to the Emergency Dept but you need to wear mask when in contact with other people.      Monoclonal Antibody Administration    You may be eligible to receive a new treatment with a monoclonal antibody for preventing hospitalization in patients at high risk for complications from COVID-19. This medication is still experimental and available on a limited basis; it is given through an IV and must be given at an infusion center. Please note that not all people who are eligible will receive the medication since it is in limited supply.  Is the patient symptomatic and onset of symptoms within the last 7 days?  Yes  Is the patient interested in a visit with a provider to discuss treatment options?: Yes  Is the patient seen at [facility] " or [facility]?  No: Warm transfer caller to [phone] to be scheduled with a virtual urgent provider.  During transfer, instruct  on appropriate time frame for visit     Review information with Patient    Your result was positive. This means you have COVID-19 (coronavirus).      How can I protect others?    These guidelines are for isolating before returning to work, school or .       If you DO have symptoms:  o Stay home and away from others  - For at least 5 days after your symptoms started, AND   - You are fever free for 24 hours (with no medicine that reduces fever), AND  - Your other symptoms are better.  o Wear a mask for 10 full days any time you are around others.    If you DON'T have symptoms:  o Stay at home and away from others for at least 5 days after your positive test.  o Wear a mask for 10 full days any time you are around others.    There may be different guidelines for healthcare facilities. Please check with the specific sites before arriving.     If you've been told by a doctor that you were severely ill with COVID-19 or are immunocompromised, you should isolate for at least 10 days.    You should not go back to work until you meet the guidelines above for ending your home isolation. You don't need to be retested for COVID-19 before going back to work--studies show that you won't spread the virus if it's been at least 10 days since your symptoms started (or 20 days, if you have a weak immune system).    Employers, schools, and daycares: This is an official notice for this person's medical guidelines for returning in-person. They must meet the above guidelines before going back to work, school, or  in person.    You will receive a positive COVID-19 letter via [employer] or the mail soon with additional self-care information.      Patient declined isolation advice, care advice. Stated she understands what to do.  Has Hashimoto's. This puts her into a high risk category.  Asking  about Angel.    Yanique Baxter RN

## 2022-10-01 ENCOUNTER — HEALTH MAINTENANCE LETTER (OUTPATIENT)
Age: 67
End: 2022-10-01

## 2022-10-05 DIAGNOSIS — E03.9 HYPOTHYROIDISM, UNSPECIFIED TYPE: ICD-10-CM

## 2022-10-05 NOTE — TELEPHONE ENCOUNTER
Pending Prescriptions:                       Disp   Refills    levothyroxine (SYNTHROID/LEVOTHROID) 100 *90 tab*1            Sig: [LEVOTHYROXINE (SYNTHROID, LEVOTHROID) 100 MCG           TABLET] TAKE 1 TABLET(100 MCG) BY MOUTH DAILY

## 2022-10-06 RX ORDER — LEVOTHYROXINE SODIUM 100 UG/1
TABLET ORAL
Qty: 90 TABLET | Refills: 0 | Status: SHIPPED | OUTPATIENT
Start: 2022-10-06 | End: 2022-12-16

## 2022-10-06 RX ORDER — LEVOTHYROXINE SODIUM 100 UG/1
TABLET ORAL
Qty: 90 TABLET | Refills: 0 | OUTPATIENT
Start: 2022-10-06

## 2022-10-06 NOTE — TELEPHONE ENCOUNTER
"Routing refill request to provider for review/approval because:  Labs not current:  TSH  A break in medication    Last Written Prescription Date:  11/11/21  Last Fill Quantity: 90,  # refills: 1   Last office visit provider:  11/11/21     Requested Prescriptions   Pending Prescriptions Disp Refills     levothyroxine (SYNTHROID/LEVOTHROID) 100 MCG tablet 90 tablet 1     Sig: [LEVOTHYROXINE (SYNTHROID, LEVOTHROID) 100 MCG TABLET] TAKE 1 TABLET(100 MCG) BY MOUTH DAILY       Thyroid Protocol Failed - 10/6/2022  7:58 AM        Failed - Normal TSH on file in past 12 months     Recent Labs   Lab Test 05/13/21  1316   TSH 0.64              Passed - Patient is 12 years or older        Passed - Recent (12 mo) or future (30 days) visit within the authorizing provider's specialty     Patient has had an office visit with the authorizing provider or a provider within the authorizing providers department within the previous 12 mos or has a future within next 30 days. See \"Patient Info\" tab in inbasket, or \"Choose Columns\" in Meds & Orders section of the refill encounter.              Passed - Medication is active on med list        Passed - No active pregnancy on record     If patient is pregnant or has had a positive pregnancy test, please check TSH.          Passed - No positive pregnancy test in past 12 months     If patient is pregnant or has had a positive pregnancy test, please check TSH.               Jason Pozo RN 10/06/22 7:58 AM  "

## 2022-10-18 ENCOUNTER — PATIENT OUTREACH (OUTPATIENT)
Dept: GERIATRIC MEDICINE | Facility: CLINIC | Age: 67
End: 2022-10-18

## 2022-10-18 NOTE — PROGRESS NOTES
[facility] Care Coordination Contact    Member became effective with  [facility] on 10/01/2022 with [facility] MSC+.  Previous Health Plan: [facility]  Previous Care System: [facility]  Previous care coordinators name and number: Lore Valdez [id number]  [phone] [email or web address]  Waiver Type: N/A  Last MMIS Entry: Date 10/11/2022 and Type Refuse HRA  MMIS visit date (and type) if different from above:  N/A  Services Listed in MMIS:   UTF received: No: Requested on 10/18/2022 email Lore for [facility]     Avelina Garcia  Care Management Specialist  [facility], [place]  [phone]

## 2022-10-18 NOTE — LETTER
October 18, 2022    NASIM KOWALSKI  [address]   [place] [address]    Dear  Nasim,    Welcome to [facility] s MSC+ health program. My name is Agnieszka Chopra RN, PHN. I am your MSC+ care coordinator. You are eligible for Care Coordination through [facility] MSC+ pla.    As your care coordinator, we ll:    Meet to go over your care coordination benefits    Talk about your physical and mental health care needs     Review your preventative care needs    Create a plan that meets your needs with the services you choose    What happens next?  I ll call you soon to introduce myself and tell you more about my role. We ll then plan time to go over your health and safety needs. Our goal is to keep you as healthy and independent as possible.    Soon, you will receive a new MSC+ member identification (ID) card from [facility]. When you receive it, please use this card along with your [place] [facility] card and Prescription Drug Coverage Program card. When you receive, it please use this card where you get your health services. If you have Medicare, you will need to show your Medicare card when you get health services.    The MSC+ care coordination program is voluntary and offered to you at no cost. If you wish to stop being in the care coordination program or have questions, call me at [phone]. If you reach my voicemail, leave a message and your phone number. TTY users, call the [place] [employer] at 356 or [phone] (ajftdg-ff-fhupiq relay service).    Sincerely,      Agnieszka Chopra RN, PHN  [phone]  [email or web address]    [id number] accepted   [id number]       [id number] (07/2022)

## 2022-10-25 ENCOUNTER — PATIENT OUTREACH (OUTPATIENT)
Dept: GERIATRIC MEDICINE | Facility: CLINIC | Age: 67
End: 2022-10-25

## 2022-10-25 ASSESSMENT — ACTIVITIES OF DAILY LIVING (ADL): DEPENDENT_IADLS:: INDEPENDENT

## 2022-10-25 NOTE — LETTER
October 26, 2022    NASIM KOWALSKI  [address]   [place] [address]        Dear Nasim:    As a member of [facility]'s MSC+ program, we offer a health risk assessment at no cost to you. I know you don't want to have the assessment right now. If you change your mind, please call me at the number below.    Who performs the health risk assessment?  A [facility] Care Coordinator performs the assessment. Our Care Coordinators can also help you understand your benefits. They can tell you about services to aid you at home, such as managing your care with your doctors if your health worsens.    Our Care Coordinators are here for you if you need:    Support for activities you used to do by yourself (including making meals, bathing and paying bills)    Equipment for bathroom or home safety    Help finding a new place to live    Information on staying healthy, preventing falls and immunizations    Questions?  If you have questions, or you would like to do he assessment, call me at [phone]. TTY users call [phone]. I'm here from 8am to 5pm. I may reach out to you again soon.       Sincerely,         Agnieszka Chopra RN, PHN  [phone]  [email or web address]      \<[id number] accepted  [id number]>    [id number] (21/2021)

## 2022-10-25 NOTE — PROGRESS NOTES
[facility] Care Coordination Contact      [facility] Refusal Telephone Assessment    Member refused home visit HRA on 10/25/22 (reason: no health issues or concerns).    ER visits: No  Hospitalizations: No  Health concerns: none  Falls/Injuries: No  ADL/IADL Dependencies:  Dependent ADLs:: Independent  Dependent IADLs:: Independent  Member currently receiving the following home care services:     Member currently receiving the following community resources: None  Informal support(s):      Advanced Care Planning discussion, complete code section.    [facility] Plan sponsored benefits: Shared information re: Silver Sneakers/gym memberships, ASA, Calcium +D.    Follow-Up Plan: Member informed of future contact, plan to f/u with member with a 6 month telephone assessment and offer a home visit.  Contact information shared with member and family, encouraged member to call with any questions or concerns at any time.    This CC note routed to PCP.    Agnieszka Chopra RN PHN  [facility]  [phone]

## 2022-10-26 NOTE — PROGRESS NOTES
"[facility] Care Coordination Contact    Per CC, mailed client a \"Refusal of Home Visit\" letter.    Avelina Garcia  Care Management Specialist  [facility]  [phone]        "

## 2022-12-16 ENCOUNTER — OFFICE VISIT (OUTPATIENT)
Dept: FAMILY MEDICINE | Facility: CLINIC | Age: 67
End: 2022-12-16
Payer: COMMERCIAL

## 2022-12-16 VITALS
HEIGHT: 64 IN | HEART RATE: 69 BPM | RESPIRATION RATE: 16 BRPM | WEIGHT: 126.2 LBS | SYSTOLIC BLOOD PRESSURE: 105 MMHG | TEMPERATURE: 98.8 F | DIASTOLIC BLOOD PRESSURE: 68 MMHG | BODY MASS INDEX: 21.54 KG/M2

## 2022-12-16 DIAGNOSIS — Z12.11 SPECIAL SCREENING FOR MALIGNANT NEOPLASMS, COLON: ICD-10-CM

## 2022-12-16 DIAGNOSIS — Z12.31 VISIT FOR SCREENING MAMMOGRAM: ICD-10-CM

## 2022-12-16 DIAGNOSIS — E03.9 HYPOTHYROIDISM, UNSPECIFIED TYPE: Primary | ICD-10-CM

## 2022-12-16 DIAGNOSIS — Z12.11 SCREEN FOR COLON CANCER: ICD-10-CM

## 2022-12-16 DIAGNOSIS — R53.83 FATIGUE DUE TO DEPRESSION: ICD-10-CM

## 2022-12-16 DIAGNOSIS — F32.A FATIGUE DUE TO DEPRESSION: ICD-10-CM

## 2022-12-16 LAB
ALBUMIN SERPL BCG-MCNC: 4.4 G/DL (ref 3.5–5.2)
ALP SERPL-CCNC: 71 U/L (ref 35–104)
ALT SERPL W P-5'-P-CCNC: 20 U/L (ref 10–35)
ANION GAP SERPL CALCULATED.3IONS-SCNC: 9 MMOL/L (ref 7–15)
AST SERPL W P-5'-P-CCNC: 24 U/L (ref 10–35)
BASOPHILS # BLD AUTO: 0 10E3/UL (ref 0–0.2)
BASOPHILS NFR BLD AUTO: 0 %
BILIRUB SERPL-MCNC: 0.5 MG/DL
BUN SERPL-MCNC: 26.9 MG/DL (ref 8–23)
CALCIUM SERPL-MCNC: 9.3 MG/DL (ref 8.8–10.2)
CHLORIDE SERPL-SCNC: 104 MMOL/L (ref 98–107)
CREAT SERPL-MCNC: 0.65 MG/DL (ref 0.51–0.95)
DEPRECATED HCO3 PLAS-SCNC: 23 MMOL/L (ref 22–29)
EOSINOPHIL # BLD AUTO: 0.3 10E3/UL (ref 0–0.7)
EOSINOPHIL NFR BLD AUTO: 4 %
ERYTHROCYTE [DISTWIDTH] IN BLOOD BY AUTOMATED COUNT: 12.6 % (ref 10–15)
GFR SERPL CREATININE-BSD FRML MDRD: >90 ML/MIN/1.73M2
GLUCOSE SERPL-MCNC: 87 MG/DL (ref 70–99)
HCT VFR BLD AUTO: 38.4 % (ref 35–47)
HGB BLD-MCNC: 13.1 G/DL (ref 11.7–15.7)
IMM GRANULOCYTES # BLD: 0 10E3/UL
IMM GRANULOCYTES NFR BLD: 0 %
LYMPHOCYTES # BLD AUTO: 2.6 10E3/UL (ref 0.8–5.3)
LYMPHOCYTES NFR BLD AUTO: 38 %
MCH RBC QN AUTO: 32 PG (ref 26.5–33)
MCHC RBC AUTO-ENTMCNC: 34.1 G/DL (ref 31.5–36.5)
MCV RBC AUTO: 94 FL (ref 78–100)
MONOCYTES # BLD AUTO: 0.6 10E3/UL (ref 0–1.3)
MONOCYTES NFR BLD AUTO: 8 %
NEUTROPHILS # BLD AUTO: 3.3 10E3/UL (ref 1.6–8.3)
NEUTROPHILS NFR BLD AUTO: 50 %
PLATELET # BLD AUTO: 270 10E3/UL (ref 150–450)
POTASSIUM SERPL-SCNC: 4.4 MMOL/L (ref 3.4–5.3)
PROT SERPL-MCNC: 6.9 G/DL (ref 6.4–8.3)
RBC # BLD AUTO: 4.1 10E6/UL (ref 3.8–5.2)
SODIUM SERPL-SCNC: 136 MMOL/L (ref 136–145)
TSH SERPL DL<=0.005 MIU/L-ACNC: 1.37 UIU/ML (ref 0.3–4.2)
WBC # BLD AUTO: 6.8 10E3/UL (ref 4–11)

## 2022-12-16 PROCEDURE — 90471 IMMUNIZATION ADMIN: CPT | Performed by: FAMILY MEDICINE

## 2022-12-16 PROCEDURE — 99214 OFFICE O/P EST MOD 30 MIN: CPT | Mod: 25 | Performed by: FAMILY MEDICINE

## 2022-12-16 PROCEDURE — 80050 GENERAL HEALTH PANEL: CPT | Performed by: FAMILY MEDICINE

## 2022-12-16 PROCEDURE — 36415 COLL VENOUS BLD VENIPUNCTURE: CPT | Performed by: FAMILY MEDICINE

## 2022-12-16 PROCEDURE — 90677 PCV20 VACCINE IM: CPT | Performed by: FAMILY MEDICINE

## 2022-12-16 RX ORDER — LEVOTHYROXINE SODIUM 100 UG/1
TABLET ORAL
Qty: 90 TABLET | Refills: 0 | Status: SHIPPED | OUTPATIENT
Start: 2022-12-16 | End: 2023-04-11

## 2022-12-16 NOTE — PROGRESS NOTES
Assessment & Plan     ICD-10-CM    1. Hypothyroidism, unspecified type  E03.9 levothyroxine (SYNTHROID/LEVOTHROID) 100 MCG tablet     TSH with free T4 reflex     TSH with free T4 reflex      2. Screen for colon cancer  Z12.11       3. Visit for screening mammogram  Z12.31       4. Fatigue due to depression  F32.A CBC with platelets and differential    R53.83 Comprehensive metabolic panel (BMP + Alb, Alk Phos, ALT, AST, Total. Bili, TP)     CBC with platelets and differential     Comprehensive metabolic panel (BMP + Alb, Alk Phos, ALT, AST, Total. Bili, TP)      5. Special screening for malignant neoplasms, colon  Z12.11 COLOGUARD(EXACT SCIENCES)        Medical decision making: Patient presents today for follow-up of hypothyroidism and lab check.  She does have fatigue that she attributes to her depression.  She has chronic depression from past PTSD and follows with psychiatrist at [facility].  She is on Lexapro and Ambien for sleep.  She was initiated on Abilify but did not tolerate it well.  She declines mammogram and DEXA scan today.  She is willing to proceed with a Cologuard.  She informs me that she had a colonoscopy at 54 years of age that was normal.  I do not see records.  She denies any history of polyps or family history of colon cancer.         Nicotine/Tobacco Cessation:  She reports that she has been smoking cigarettes. She has a 20.00 pack-year smoking history. She has never used smokeless tobacco.  Nicotine/Tobacco Cessation Plan:   Self help information given to patient      MEDICATIONS:  Continue current medications without change  See Patient Instructions    Return in about 1 year (around 12/16/2023) for Routine preventive.    Nina Orellana MD  [facility]    Suhail Lara is a 67 year old, presenting for the following health issues:  Recheck Medication      History of Present Illness       Hypothyroidism:     Since last visit, patient describes the following  "symptoms::  Anxiety, Depression and Fatigue    She eats 2-3 servings of fruits and vegetables daily.She consumes 0 sweetened beverage(s) daily.She exercises with enough effort to increase her heart rate 9 or less minutes per day.  She exercises with enough effort to increase her heart rate 3 or less days per week.   She is taking medications regularly.     Patient Active Problem List   Diagnosis     Allergic Rhinitis     Hoarseness     Insomnia     Lipoma Of The Adipose Tissue     Tingling (Paresthesia)     Hypothyroidism     Osteopenia     Symptomatic Menopause     Osteoporosis     Nocturia     Anxiety     Insomnia     Nicotine abuse     Hoarseness     Current Outpatient Medications   Medication     ascorbic acid (VITAMIN C) Powd     calcium carbonate-vit D3-min (CALCIUM-VITAMIN D) 600 mg calcium- 400 unit Tab     CHOLECALCIFEROL, VITAMIN D3, (VITAMIN D3 ORAL)     escitalopram oxalate (LEXAPRO) 10 MG tablet     escitalopram oxalate (LEXAPRO) 5 MG tablet     levothyroxine (SYNTHROID/LEVOTHROID) 100 MCG tablet     zolpidem (AMBIEN) 5 MG tablet     No current facility-administered medications for this visit.         Review of Systems   Constitutional, HEENT, cardiovascular, pulmonary, gi and gu systems are negative, except as otherwise noted.      Objective    /68 (BP Location: Left arm, Patient Position: Sitting, Cuff Size: Adult Regular)   Pulse 69   Temp 98.8  F (37.1  C) (Oral)   Resp 16   Ht 1.626 m (5' 4\")   Wt 57.2 kg (126 lb 3.2 oz)   BMI 21.66 kg/m    Body mass index is 21.66 kg/m .  Physical Exam   GENERAL: healthy, alert and no distress  HENT: ear canals and TM's normal, nose and mouth without ulcers or lesions  NECK: no adenopathy, no asymmetry, masses, or scars and thyroid normal to palpation  RESP: lungs clear to auscultation - no rales, rhonchi or wheezes  CV: regular rate and rhythm, normal S1 S2, no S3 or S4, no murmur, click or rub, no peripheral edema and peripheral pulses " strong  ABDOMEN: soft, nontender, no hepatosplenomegaly, no masses and bowel sounds normal

## 2023-01-20 ENCOUNTER — PATIENT OUTREACH (OUTPATIENT)
Dept: GERIATRIC MEDICINE | Facility: CLINIC | Age: 68
End: 2023-01-20
Payer: COMMERCIAL

## 2023-01-20 NOTE — PROGRESS NOTES
[facility] Care Coordination Contact    Internal CC change effective 2/1/2023.  Mailed member CC Change letter.  Additional tasks to be completed by CMS include: update database & EPIC, enter CC Change in MMIS, and move member files on Q drive.    Yanique Seals  Case Management Specialist  [facility]  [phone]

## 2023-01-20 NOTE — LETTER
January 20, 2023    JANE KOWALSKI  [address]   [place] [address]      Dear Jane:    As a member of [facility] ([facility] you are provided a care coordinator. I will be your new care coordinator as of 2/1/2023. I will be calling you soon to see how you are doing and determine your needs.    If you have any questions, please feel free to call me at [phone]. If you reach my voice mail, please leave a message and your phone number. If you are hearing impaired, please call the [place] Relay at [phone] or [phone] (qkvcuq-eb-uzkshj relay service).    I look forward to speaking with you soon.    Sincerely,          Deloris Munoz RN  [phone]  [email or web address]        [facility]          [facility]  [id number] DHS Approved ([id number])  [id number] (11/18)

## 2023-02-05 ENCOUNTER — HEALTH MAINTENANCE LETTER (OUTPATIENT)
Age: 68
End: 2023-02-05

## 2023-04-10 DIAGNOSIS — E03.9 HYPOTHYROIDISM, UNSPECIFIED TYPE: ICD-10-CM

## 2023-04-11 RX ORDER — LEVOTHYROXINE SODIUM 100 UG/1
TABLET ORAL
Qty: 90 TABLET | Refills: 2 | Status: SHIPPED | OUTPATIENT
Start: 2023-04-11 | End: 2024-01-15

## 2023-04-11 NOTE — TELEPHONE ENCOUNTER
"Last Written Prescription Date:  12/16/2022  Last Fill Quantity: 90,  # refills: 0   Last office visit provider:  12/16/2022     Requested Prescriptions   Pending Prescriptions Disp Refills     levothyroxine (SYNTHROID/LEVOTHROID) 100 MCG tablet 90 tablet 0     Sig: [LEVOTHYROXINE (SYNTHROID, LEVOTHROID) 100 MCG TABLET] TAKE 1 TABLET(100 MCG) BY MOUTH DAILY       Thyroid Protocol Passed - 4/11/2023  2:20 PM        Passed - Patient is 12 years or older        Passed - Recent (12 mo) or future (30 days) visit within the authorizing provider's specialty     Patient has had an office visit with the authorizing provider or a provider within the authorizing providers department within the previous 12 mos or has a future within next 30 days. See \"Patient Info\" tab in inbasket, or \"Choose Columns\" in Meds & Orders section of the refill encounter.              Passed - Medication is active on med list        Passed - Normal TSH on file in past 12 months     Recent Labs   Lab Test 12/16/22  1333   TSH 1.37              Passed - No active pregnancy on record     If patient is pregnant or has had a positive pregnancy test, please check TSH.          Passed - No positive pregnancy test in past 12 months     If patient is pregnant or has had a positive pregnancy test, please check TSH.               Ysabel Santana RN 04/11/23 2:20 PM  "

## 2023-04-14 ENCOUNTER — PATIENT OUTREACH (OUTPATIENT)
Dept: GERIATRIC MEDICINE | Facility: CLINIC | Age: 68
End: 2023-04-14
Payer: COMMERCIAL

## 2023-04-14 NOTE — LETTER
April 14, 2023    NASIM KOWALSKI  [address]   [place] [address]    Dear Nasim:     I m your care coordinator. I ve been unable to reach you by phone. I am writing to ask you or your authorized representative to call me at [phone]. If you reach my voicemail, leave a message with your daytime phone number. Include a date and time that I can call you. If you are hearing impaired, call the [place] Relay at 252 or [phone] (cznatq-yu-bmyeaf relay service).    The reason I am trying to reach you is:     [] To schedule an assessment  [x] For your six (6)-month check-in  [] Other:      Please call me as soon as you receive this letter. I look forward to speaking with you.    Sincerely,      Deloris Munoz RN  [phone]  [email or web address]        [employer]        [id number] accepted  [id number]                                                                        B  (08/2022)

## 2023-04-14 NOTE — PROGRESS NOTES
"[facility] Care Coordination Contact    Per CC, mailed client an \"Unable to Contact\" letter.    Avelina Garcia  Care Management Specialist  [facility]  [phone]        "

## 2023-04-21 ENCOUNTER — PATIENT OUTREACH (OUTPATIENT)
Dept: GERIATRIC MEDICINE | Facility: CLINIC | Age: 68
End: 2023-04-21
Payer: COMMERCIAL

## 2023-04-21 NOTE — PROGRESS NOTES
[facility] Care Coordination Contact      [facility] Six-Month Telephone Assessment    6 month telephone assessment completed on 04/21/23.    ER visits: No  Hospitalizations: No  TCU stays: No  Significant health status changes: no significant changes  Falls/Injuries: No  ADL/IADL changes: No  Changes in services: No    Caregiver Assessment follow up:  No services in place. Able to make appointments to see PCP independently    Goals: See POC in chart for goal progress documentation.  Jane reports independence in ADLs.  Decline home visit.  No falls or hospitalizations.      Will see member in 6 months for an annual health risk assessment.   Encouraged member to call CC with any questions or concerns in the meantime.     Deloris Munoz RN  [facility]  [phone]

## 2023-08-06 ENCOUNTER — HEALTH MAINTENANCE LETTER (OUTPATIENT)
Age: 68
End: 2023-08-06

## 2023-09-05 ENCOUNTER — PATIENT OUTREACH (OUTPATIENT)
Dept: GERIATRIC MEDICINE | Facility: CLINIC | Age: 68
End: 2023-09-05
Payer: COMMERCIAL

## 2023-09-05 ASSESSMENT — ACTIVITIES OF DAILY LIVING (ADL): DEPENDENT_IADLS:: INDEPENDENT

## 2023-09-05 NOTE — LETTER
September 6, 2023    NASIM KOWALSKI  [address]   [place] [address]        Dear Nasim:    As a member of [facility]'s MSC+ program, we offer a health risk assessment at no cost to you. I know you don't want to have the assessment right now. If you change your mind, please call me at the number below.    Who performs the health risk assessment?  A [facility] Care Coordinator performs the assessment. Our Care Coordinators can also help you understand your benefits. They can tell you about services to aid you at home, such as managing your care with your doctors if your health worsens.    Our Care Coordinators are here for you if you need:  Support for activities you used to do by yourself (including making meals, bathing and paying bills)  Equipment for bathroom or home safety  Help finding a new place to live  Information on staying healthy, preventing falls and immunizations    Questions?  If you have questions, or you would like to do he assessment, call me at [phone]. TTY users call [phone]. I'm here from 8am to 5pm. I may reach out to you again soon.       Sincerely,           Deloris Munoz RN  [phone]  [email or web address]        [employer]      \<[id number] accepted  [id number]>    [id number] (21/2021)

## 2023-09-05 NOTE — PROGRESS NOTES
[facility] Care Coordination Contact      [facility] Refusal Telephone Assessment    Member refused home visit HRA on 9/5/23 (reason: Decline as she reports she is independent with her ADLs and IADLS).    ER visits: No  Hospitalizations: No  Health concerns: None  Falls/Injuries: No  ADL/IADL Dependencies:  Dependent ADLs:: Independent  Dependent IADLs:: Independent  Member currently receiving the following home care services:     Member currently receiving the following community resources: None  Informal support(s):      Advanced Care Planning discussion, complete code section.    [facility] Plan sponsored benefits: Shared information re: One pass/gym memberships, ASA, Calcium +D.    Follow-Up Plan: Member informed of future contact, plan to f/u with member with a 6 month telephone assessment and offer a home visit.  Contact information shared with member and family, encouraged member to call with any questions or concerns at any time.    This CC note routed to PCP, Nina Orellana.    Deloris Munoz RN PHN [facility]  [phone]

## 2023-09-05 NOTE — Clinical Note
HRA HV Refusal on 9/5/23.  Report no needs at this time.  Independent with her ADLs and IADLs.    Deloris Munoz RN PHN [facility] [phone]

## 2023-09-06 NOTE — PROGRESS NOTES
"[facility] Care Coordination Contact    Per CC, mailed client a \"Refusal of Home Visit\" letter.    Avelina Garcia  Care Management Specialist  [facility]  [phone]        "

## 2023-11-10 ENCOUNTER — TRANSFERRED RECORDS (OUTPATIENT)
Dept: HEALTH INFORMATION MANAGEMENT | Facility: CLINIC | Age: 68
End: 2023-11-10
Payer: COMMERCIAL

## 2024-01-09 ENCOUNTER — TELEPHONE (OUTPATIENT)
Dept: FAMILY MEDICINE | Facility: CLINIC | Age: 69
End: 2024-01-09
Payer: COMMERCIAL

## 2024-01-09 NOTE — TELEPHONE ENCOUNTER
"Patient c/o \"parasites in my gut\", insomnia, anxiety, poor appetite, \"going out of my mind\". States she's tried \"natural\" things, and is requesting medication to get rid of the parasites. Scheduled for 1000 (1020) on 1/11/24.  "
143

## 2024-01-11 ENCOUNTER — OFFICE VISIT (OUTPATIENT)
Dept: FAMILY MEDICINE | Facility: CLINIC | Age: 69
End: 2024-01-11
Payer: COMMERCIAL

## 2024-01-11 ENCOUNTER — ANCILLARY PROCEDURE (OUTPATIENT)
Dept: GENERAL RADIOLOGY | Facility: CLINIC | Age: 69
End: 2024-01-11
Attending: FAMILY MEDICINE
Payer: COMMERCIAL

## 2024-01-11 VITALS
HEIGHT: 64 IN | OXYGEN SATURATION: 96 % | RESPIRATION RATE: 20 BRPM | SYSTOLIC BLOOD PRESSURE: 102 MMHG | HEART RATE: 76 BPM | DIASTOLIC BLOOD PRESSURE: 73 MMHG | WEIGHT: 125 LBS | BODY MASS INDEX: 21.34 KG/M2

## 2024-01-11 DIAGNOSIS — E03.9 HYPOTHYROIDISM, UNSPECIFIED TYPE: ICD-10-CM

## 2024-01-11 DIAGNOSIS — F51.04 PSYCHOPHYSIOLOGICAL INSOMNIA: ICD-10-CM

## 2024-01-11 DIAGNOSIS — K59.09 CHRONIC CONSTIPATION: Primary | ICD-10-CM

## 2024-01-11 DIAGNOSIS — K59.09 CHRONIC CONSTIPATION: ICD-10-CM

## 2024-01-11 PROCEDURE — 84443 ASSAY THYROID STIM HORMONE: CPT | Performed by: FAMILY MEDICINE

## 2024-01-11 PROCEDURE — 74019 RADEX ABDOMEN 2 VIEWS: CPT | Mod: TC | Performed by: RADIOLOGY

## 2024-01-11 PROCEDURE — 99214 OFFICE O/P EST MOD 30 MIN: CPT | Performed by: FAMILY MEDICINE

## 2024-01-11 PROCEDURE — 36415 COLL VENOUS BLD VENIPUNCTURE: CPT | Performed by: FAMILY MEDICINE

## 2024-01-11 RX ORDER — HYDROXYZINE HYDROCHLORIDE 25 MG/1
25 TABLET, FILM COATED ORAL
Qty: 30 TABLET | Refills: 1 | Status: SHIPPED | OUTPATIENT
Start: 2024-01-11 | End: 2024-03-05

## 2024-01-11 ASSESSMENT — ANXIETY QUESTIONNAIRES
GAD7 TOTAL SCORE: 9
5. BEING SO RESTLESS THAT IT IS HARD TO SIT STILL: MORE THAN HALF THE DAYS
7. FEELING AFRAID AS IF SOMETHING AWFUL MIGHT HAPPEN: NOT AT ALL
6. BECOMING EASILY ANNOYED OR IRRITABLE: MORE THAN HALF THE DAYS
7. FEELING AFRAID AS IF SOMETHING AWFUL MIGHT HAPPEN: NOT AT ALL
8. IF YOU CHECKED OFF ANY PROBLEMS, HOW DIFFICULT HAVE THESE MADE IT FOR YOU TO DO YOUR WORK, TAKE CARE OF THINGS AT HOME, OR GET ALONG WITH OTHER PEOPLE?: SOMEWHAT DIFFICULT
IF YOU CHECKED OFF ANY PROBLEMS ON THIS QUESTIONNAIRE, HOW DIFFICULT HAVE THESE PROBLEMS MADE IT FOR YOU TO DO YOUR WORK, TAKE CARE OF THINGS AT HOME, OR GET ALONG WITH OTHER PEOPLE: SOMEWHAT DIFFICULT
3. WORRYING TOO MUCH ABOUT DIFFERENT THINGS: SEVERAL DAYS
GAD7 TOTAL SCORE: 9
1. FEELING NERVOUS, ANXIOUS, OR ON EDGE: SEVERAL DAYS
4. TROUBLE RELAXING: MORE THAN HALF THE DAYS
2. NOT BEING ABLE TO STOP OR CONTROL WORRYING: SEVERAL DAYS
GAD7 TOTAL SCORE: 9

## 2024-01-11 ASSESSMENT — PATIENT HEALTH QUESTIONNAIRE - PHQ9
SUM OF ALL RESPONSES TO PHQ QUESTIONS 1-9: 9
SUM OF ALL RESPONSES TO PHQ QUESTIONS 1-9: 9
10. IF YOU CHECKED OFF ANY PROBLEMS, HOW DIFFICULT HAVE THESE PROBLEMS MADE IT FOR YOU TO DO YOUR WORK, TAKE CARE OF THINGS AT HOME, OR GET ALONG WITH OTHER PEOPLE: SOMEWHAT DIFFICULT

## 2024-01-11 NOTE — PROGRESS NOTES
Assessment & Plan     Chronic constipation  Has developed infrequent bowel movements approximately 3 times weekly over the past few months.  This is associated with abdominal bloating, gas pain, and inability to eat wide varieties of foods.  Her physical symptoms have become very distressing to her and have been impacting her mental health.  She believes she has seen white string-like material in her stool and is requesting a lab to look for parasites today.  She has not had any recent international travel, we discussed low likelihood of a parasitic infection but she feels this could explain a multitude of her symptoms.  Vitals are stable today and her abdomen is benign.  Will assess stool burden with abdominal x-ray and offer O/P stool studies.  Ultimately recommend colonoscopy, but she unfortunately will be canceling her upcoming appointment due to lack of transportation.  We reviewed her visit with GI from November 2023 for similar symptoms, though she felt this visit was unhelpful and has found no benefit with stool softeners and MiraLAX and is declining additional bowel medications to help with constipation.  - XR Abdomen 2 Views; Future  - Ova and Parasite Exam Routine    Hypothyroidism, unspecified type  Assess TSH to ensure levothyroxine remains therapeutic, which could be contributing to constipation.  Provide dose adjustment if needed.  - TSH    Psychophysiological insomnia  No longer on Ambien and recommended avoiding this since the higher risk medication.  Hydroxyzine offered for sleep support.  Will avoid trazodone for now since could contribute to further constipation.  - hydrOXYzine HCl (ATARAX) 25 MG tablet; Take 1 tablet (25 mg) by mouth nightly as needed for other (sleep)      DO AMY [facility]    Suhail Lara is a 68 year old, presenting for the following health issues:  Gastrointestinal Problem (X a few months, constipation, bloating, gas, sleep  "issues, loss of appetite, rash on hands and ankles)        1/11/2024     9:55 AM   Additional Questions   Roomed by WESTLEY Rudolph CMA   Accompanied by -       History of Present Illness       Reason for visit:  Gut issues  Symptom onset:  More than a month  Symptoms include:  Sleep issues anxiety problems with digestion constipation loss of appetite  Symptom intensity:  Severe  Symptom progression:  Staying the same  Had these symptoms before:  No  What makes it worse:  Eating  What makes it better:  Not eating    She eats 2-3 servings of fruits and vegetables daily.She consumes 1 sweetened beverage(s) daily.She exercises with enough effort to increase her heart rate 30 to 60 minutes per day.  She exercises with enough effort to increase her heart rate 3 or less days per week.   She is taking medications regularly.     \"I think I have an intesnital parasite.\" Few months of constipation, decreased appetitie. If eating anything that wasn't bland, would get more constipated, abdominal bloating, gas. Sleep disturbances, difficulty falling aseep and staying asleep. Rash on palms and soles. Born in the [facility], no international travel. Though she saw a long, threadlike subtance in her stool x 2. Feels she can feel things moving in her intesine. BMs 3 times weekly, previously would have BM after meals. Only eating one small meal a day. No untintentional weight loss. No blood in stool. Tried fiber supplemenation and stool softener. Mirlax once a day not helpful.    Scheduled for colonoscopy but plans to cancel this because her sister was going to take her to the procedure and they had a falling out.  She also wants to be in a better headspace before undergoing the prep and procedure.    Notes she is very distressed by her symptoms and inability to eat the foods she wants.  Surprised she has not lost weight because she is only eating 1 meal per day that typically involves a bland salad.  Has become more isolated since COVID.  " "Planning to attend [employer] more regularly which she feels will be helpful.    Her physical health is also causing impact on her mental health and sleep.  She has difficulty falling asleep and may be awake till 4 AM.  Once she is asleep she has frequent nighttime awakenings.  She had been on Ambien in the past.  Would be willing to try an alternative.      Objective    /73   Pulse 76   Resp 20   Ht 1.626 m (5' 4\")   Wt 56.7 kg (125 lb)   SpO2 96%   BMI 21.46 kg/m    Body mass index is 21.46 kg/m .  Physical Exam   GENERAL: alert  RESP: lungs clear to auscultation - no rales, rhonchi or wheezes  CV: regular rates and rhythm and no murmur, click or rub  ABDOMEN: soft, nontender and active bowel sounds, nondistended  PSYCH: anxious and appearance well groomed        Answers submitted by the patient for this visit:  Patient Health Questionnaire (Submitted on 1/11/2024)  If you checked off any problems, how difficult have these problems made it for you to do your work, take care of things at home, or get along with other people?: Somewhat difficult  PHQ9 TOTAL SCORE: 9  DEBORA-7 (Submitted on 1/11/2024)  DEBORA 7 TOTAL SCORE: 9    "

## 2024-01-11 NOTE — CONFIDENTIAL NOTE
"    Interpersonal Safety (Abuse) Screening Follow Up    Interpersonal Safety Screen  Do you feel physically and emotionally safe where you currently live?: No  Within the past 12 months, have you been hit, slapped, kicked or otherwise physically hurt by someone?: No  Within the past 12 months, have you been humiliated or emotionally abused in other ways by your partner or ex-partner?: No      Summary of concern: Patient feels she lives in [place] [facility] [place] in an apartment building.  Notes \"You can thank my ex- for that.\"  Because of this she tries to avoid the people that live in her apartment building.    Follow Up  Will offer social work consult for alternative living options      "

## 2024-01-12 LAB — TSH SERPL DL<=0.005 MIU/L-ACNC: 10.4 UIU/ML (ref 0.3–4.2)

## 2024-01-12 PROCEDURE — 87209 SMEAR COMPLEX STAIN: CPT | Performed by: FAMILY MEDICINE

## 2024-01-12 PROCEDURE — 87177 OVA AND PARASITES SMEARS: CPT | Performed by: FAMILY MEDICINE

## 2024-01-15 ENCOUNTER — TELEPHONE (OUTPATIENT)
Dept: FAMILY MEDICINE | Facility: CLINIC | Age: 69
End: 2024-01-15
Payer: COMMERCIAL

## 2024-01-15 LAB — O+P STL MICRO: NEGATIVE

## 2024-01-15 RX ORDER — LEVOTHYROXINE SODIUM 112 UG/1
112 TABLET ORAL
Qty: 90 TABLET | Refills: 0 | Status: SHIPPED | OUTPATIENT
Start: 2024-01-15 | End: 2024-04-22

## 2024-01-15 NOTE — TELEPHONE ENCOUNTER
----- Message from Lisa Jackson DO sent at 1/15/2024  7:15 AM CST -----  Team - please call patient with results.    Your thyroid level was slightly abnormal so we should increase your levothyroxine to 112 mcg daily.  This may help with your constipation as well.  Please schedule follow-up with Dr. BISHOP in 6 to 8 weeks for reassessment and repeat thyroid lab testing.    Lisa Jackson DO

## 2024-01-15 NOTE — TELEPHONE ENCOUNTER
Patient requesting call back with results: Abdominal x-ray showed moderate stool in the colon.  No concerns for blockage.    Lisa Jackson, DO

## 2024-03-05 ENCOUNTER — OFFICE VISIT (OUTPATIENT)
Dept: FAMILY MEDICINE | Facility: CLINIC | Age: 69
End: 2024-03-05
Payer: COMMERCIAL

## 2024-03-05 VITALS
HEART RATE: 78 BPM | HEIGHT: 64 IN | BODY MASS INDEX: 20.76 KG/M2 | SYSTOLIC BLOOD PRESSURE: 106 MMHG | WEIGHT: 121.6 LBS | OXYGEN SATURATION: 98 % | DIASTOLIC BLOOD PRESSURE: 68 MMHG | RESPIRATION RATE: 18 BRPM

## 2024-03-05 DIAGNOSIS — Z12.31 VISIT FOR SCREENING MAMMOGRAM: ICD-10-CM

## 2024-03-05 DIAGNOSIS — Z87.81 HISTORY OF RIB FRACTURE: ICD-10-CM

## 2024-03-05 DIAGNOSIS — Z87.81 HISTORY OF WRIST FRACTURE: ICD-10-CM

## 2024-03-05 DIAGNOSIS — F41.9 ANXIETY: ICD-10-CM

## 2024-03-05 DIAGNOSIS — E03.9 HYPOTHYROIDISM, UNSPECIFIED TYPE: Primary | ICD-10-CM

## 2024-03-05 DIAGNOSIS — M81.0 OSTEOPOROSIS WITHOUT CURRENT PATHOLOGICAL FRACTURE, UNSPECIFIED OSTEOPOROSIS TYPE: ICD-10-CM

## 2024-03-05 PROCEDURE — G2211 COMPLEX E/M VISIT ADD ON: HCPCS | Performed by: FAMILY MEDICINE

## 2024-03-05 PROCEDURE — 99214 OFFICE O/P EST MOD 30 MIN: CPT | Performed by: FAMILY MEDICINE

## 2024-03-05 PROCEDURE — 84439 ASSAY OF FREE THYROXINE: CPT | Performed by: FAMILY MEDICINE

## 2024-03-05 PROCEDURE — 84443 ASSAY THYROID STIM HORMONE: CPT | Performed by: FAMILY MEDICINE

## 2024-03-05 PROCEDURE — 36415 COLL VENOUS BLD VENIPUNCTURE: CPT | Performed by: FAMILY MEDICINE

## 2024-03-05 RX ORDER — ZOLPIDEM TARTRATE 5 MG/1
TABLET ORAL
COMMUNITY
Start: 2024-03-04

## 2024-03-05 RX ORDER — ESCITALOPRAM OXALATE 10 MG/1
15 TABLET ORAL DAILY
COMMUNITY
Start: 2024-01-29

## 2024-03-05 NOTE — PROGRESS NOTES
Assessment & Plan     ICD-10-CM    1. Hypothyroidism, unspecified type  E03.9 TSH with free T4 reflex     TSH with free T4 reflex      2. Osteoporosis without current pathological fracture, unspecified osteoporosis type  M81.0 DEXA HIP/PELVIS/SPINE - Future      3. Visit for screening mammogram  Z12.31 MA SCREENING DIGITAL BILAT - Future  (s+30)      4. Anxiety  F41.9       5. History of wrist fracture  Z87.81       6. History of rib fracture  Z87.81         Patient today for hypothyroidism follow-up.  Recently levothyroxine was increased.  Check TSH as above.  She has had abdominal bloating.  Evaluated by [facility] on 11/30/2023.  Recommended colonoscopy for follow-up of previous polyps.  Today also discussed her rib hip fracture and wrist fracture and BMI of 20 and recommend DEXA scan she is agreeable.  She informs me that she has been told she has osteoporosis.  Also asked her to have her mammogram done and orders are placed.  Chart is reviewed.  For anxiety, she is following with [facility].  She is single and went through a divorce.  She was with her partner for 20 years who then had substance abuse.  Currently lives in [facility] [place]  Lost her 2 cats to cancer.  She is currently on Lexapro and Ambien.    The longitudinal plan of care for the diagnosis(es)/condition(s) as documented were addressed during this visit. Due to the added complexity in care, I will continue to support Jane in the subsequent management and with ongoing continuity of care.      MEDICATIONS:  Continue current medications without change    Subjective   Jane is a 68 year old, presenting for the following health issues:  RECHECK (Follow up on thyroid and stomach. )        3/5/2024    12:58 PM   Additional Questions   Roomed by Mary Tellez CMA     HPI     Patient Active Problem List   Diagnosis    Allergic Rhinitis    Hoarseness    Insomnia    Lipoma Of The Adipose Tissue    Tingling (Paresthesia)    Hypothyroidism    Osteopenia     "Symptomatic Menopause    Osteoporosis    Nocturia    Anxiety    Insomnia    Nicotine abuse    Hoarseness    History of wrist fracture    History of rib fracture     Current Outpatient Medications   Medication    ascorbic acid (VITAMIN C) Powd    CHOLECALCIFEROL, VITAMIN D3, (VITAMIN D3 ORAL)    escitalopram (LEXAPRO) 10 MG tablet    levothyroxine (SYNTHROID/LEVOTHROID) 112 MCG tablet    zolpidem (AMBIEN) 5 MG tablet     No current facility-administered medications for this visit.           Review of Systems  Constitutional, HEENT, cardiovascular, pulmonary, gi and gu systems are negative, except as otherwise noted.      Objective    /68 (BP Location: Left arm, Patient Position: Sitting, Cuff Size: Adult Regular)   Pulse 78   Resp 18   Ht 1.626 m (5' 4\")   Wt 55.2 kg (121 lb 9.6 oz)   SpO2 98%   BMI 20.87 kg/m    Body mass index is 20.87 kg/m .  Physical Exam   GENERAL: alert and no distress    Office Visit on 01/11/2024   Component Date Value Ref Range Status    TSH 01/11/2024 10.40 (H)  0.30 - 4.20 uIU/mL Final    OVA AND PARASITE EXAM 01/12/2024 Negative  Negative Final    A single negative specimen does not rule out parasitic infection.           Signed Electronically by: Nina Orellana MD    "

## 2024-03-06 LAB
T4 FREE SERPL-MCNC: 2.02 NG/DL (ref 0.9–1.7)
TSH SERPL DL<=0.005 MIU/L-ACNC: 0.1 UIU/ML (ref 0.3–4.2)

## 2024-03-07 DIAGNOSIS — E03.9 HYPOTHYROIDISM, UNSPECIFIED TYPE: Primary | ICD-10-CM

## 2024-03-12 ENCOUNTER — PATIENT OUTREACH (OUTPATIENT)
Dept: FAMILY MEDICINE | Facility: CLINIC | Age: 69
End: 2024-03-12
Payer: COMMERCIAL

## 2024-03-12 RX ORDER — LEVOTHYROXINE SODIUM 100 UG/1
100 TABLET ORAL DAILY
Qty: 90 TABLET | Refills: 0 | Status: SHIPPED | OUTPATIENT
Start: 2024-03-12 | End: 2024-08-09

## 2024-03-12 NOTE — TELEPHONE ENCOUNTER
Patient Quality Outreach    Patient is due for the following:   Physical Annual Wellness Visit    Next Steps:   Patient declined follow up at this time.    Type of outreach:    Phone, spoke to patient/parent. Declined visit at this time      Questions for provider review:    None           Rin Floyd MA

## 2024-03-18 ENCOUNTER — PATIENT OUTREACH (OUTPATIENT)
Dept: GERIATRIC MEDICINE | Facility: CLINIC | Age: 69
End: 2024-03-18
Payer: COMMERCIAL

## 2024-03-18 NOTE — PROGRESS NOTES
[facility] Care Coordination Contact      [facility] Six-Month Telephone Assessment    6 month telephone assessment completed on 03/18/2024.    ER visits: No  Hospitalizations: No  TCU stays: No  Significant health status changes: No changes  Falls/Injuries: No  ADL/IADL changes: No  Changes in services: No    Caregiver Assessment follow up:  Decline home visit. Information given regarding [facility] for application of SNAP.  Asked Margaret Carpiobbins to assist with affordable housing for member.      Goals: See POC in chart for goal progress documentation.      Will see member in 6 months for an annual health risk assessment.   Encouraged member to call CC with any questions or concerns in the meantime.     Deloris Munoz RN PHN [facility]  [phone]

## 2024-04-20 DIAGNOSIS — E03.9 HYPOTHYROIDISM, UNSPECIFIED TYPE: ICD-10-CM

## 2024-04-22 RX ORDER — LEVOTHYROXINE SODIUM 112 UG/1
TABLET ORAL
Qty: 90 TABLET | Refills: 0 | Status: SHIPPED | OUTPATIENT
Start: 2024-04-22 | End: 2024-08-09

## 2024-04-25 ENCOUNTER — LAB (OUTPATIENT)
Dept: LAB | Facility: CLINIC | Age: 69
End: 2024-04-25
Payer: COMMERCIAL

## 2024-04-25 DIAGNOSIS — E03.9 HYPOTHYROIDISM, UNSPECIFIED TYPE: ICD-10-CM

## 2024-04-25 PROCEDURE — 84443 ASSAY THYROID STIM HORMONE: CPT

## 2024-04-25 PROCEDURE — 36415 COLL VENOUS BLD VENIPUNCTURE: CPT

## 2024-04-26 LAB — TSH SERPL DL<=0.005 MIU/L-ACNC: 1.35 UIU/ML (ref 0.3–4.2)

## 2024-08-09 ENCOUNTER — OFFICE VISIT (OUTPATIENT)
Dept: FAMILY MEDICINE | Facility: CLINIC | Age: 69
End: 2024-08-09

## 2024-08-09 VITALS
HEART RATE: 75 BPM | TEMPERATURE: 98.5 F | RESPIRATION RATE: 22 BRPM | DIASTOLIC BLOOD PRESSURE: 70 MMHG | SYSTOLIC BLOOD PRESSURE: 101 MMHG | OXYGEN SATURATION: 96 % | BODY MASS INDEX: 20.52 KG/M2 | HEIGHT: 64 IN | WEIGHT: 120.2 LBS

## 2024-08-09 DIAGNOSIS — E03.9 HYPOTHYROIDISM, UNSPECIFIED TYPE: ICD-10-CM

## 2024-08-09 DIAGNOSIS — Z12.11 SCREEN FOR COLON CANCER: ICD-10-CM

## 2024-08-09 DIAGNOSIS — Z00.00 ENCOUNTER FOR MEDICARE ANNUAL WELLNESS EXAM: Primary | ICD-10-CM

## 2024-08-09 DIAGNOSIS — Z87.891 PERSONAL HISTORY OF TOBACCO USE: ICD-10-CM

## 2024-08-09 DIAGNOSIS — J30.9 ALLERGIC RHINITIS, UNSPECIFIED SEASONALITY, UNSPECIFIED TRIGGER: ICD-10-CM

## 2024-08-09 LAB
ALBUMIN SERPL BCG-MCNC: 4.5 G/DL (ref 3.5–5.2)
ALP SERPL-CCNC: 92 U/L (ref 40–150)
ALT SERPL W P-5'-P-CCNC: 15 U/L (ref 0–50)
ANION GAP SERPL CALCULATED.3IONS-SCNC: 8 MMOL/L (ref 7–15)
AST SERPL W P-5'-P-CCNC: 19 U/L (ref 0–45)
BASOPHILS # BLD AUTO: 0 10E3/UL (ref 0–0.2)
BASOPHILS NFR BLD AUTO: 1 %
BILIRUB SERPL-MCNC: 0.5 MG/DL
BUN SERPL-MCNC: 19.3 MG/DL (ref 8–23)
CALCIUM SERPL-MCNC: 9.8 MG/DL (ref 8.8–10.4)
CHLORIDE SERPL-SCNC: 104 MMOL/L (ref 98–107)
CREAT SERPL-MCNC: 0.71 MG/DL (ref 0.51–0.95)
EGFRCR SERPLBLD CKD-EPI 2021: >90 ML/MIN/1.73M2
EOSINOPHIL # BLD AUTO: 0.2 10E3/UL (ref 0–0.7)
EOSINOPHIL NFR BLD AUTO: 4 %
ERYTHROCYTE [DISTWIDTH] IN BLOOD BY AUTOMATED COUNT: 12.5 % (ref 10–15)
GLUCOSE SERPL-MCNC: 76 MG/DL (ref 70–99)
HCO3 SERPL-SCNC: 28 MMOL/L (ref 22–29)
HCT VFR BLD AUTO: 40.9 % (ref 35–47)
HGB BLD-MCNC: 13.6 G/DL (ref 11.7–15.7)
IMM GRANULOCYTES # BLD: 0 10E3/UL
IMM GRANULOCYTES NFR BLD: 0 %
LYMPHOCYTES # BLD AUTO: 2.4 10E3/UL (ref 0.8–5.3)
LYMPHOCYTES NFR BLD AUTO: 39 %
MCH RBC QN AUTO: 31.4 PG (ref 26.5–33)
MCHC RBC AUTO-ENTMCNC: 33.3 G/DL (ref 31.5–36.5)
MCV RBC AUTO: 95 FL (ref 78–100)
MONOCYTES # BLD AUTO: 0.4 10E3/UL (ref 0–1.3)
MONOCYTES NFR BLD AUTO: 6 %
NEUTROPHILS # BLD AUTO: 3.1 10E3/UL (ref 1.6–8.3)
NEUTROPHILS NFR BLD AUTO: 50 %
PLATELET # BLD AUTO: 275 10E3/UL (ref 150–450)
POTASSIUM SERPL-SCNC: 4.8 MMOL/L (ref 3.4–5.3)
PROT SERPL-MCNC: 7.1 G/DL (ref 6.4–8.3)
RBC # BLD AUTO: 4.33 10E6/UL (ref 3.8–5.2)
SODIUM SERPL-SCNC: 140 MMOL/L (ref 135–145)
TSH SERPL DL<=0.005 MIU/L-ACNC: 0.95 UIU/ML (ref 0.3–4.2)
WBC # BLD AUTO: 6.1 10E3/UL (ref 4–11)

## 2024-08-09 PROCEDURE — 80053 COMPREHEN METABOLIC PANEL: CPT | Performed by: FAMILY MEDICINE

## 2024-08-09 PROCEDURE — 85025 COMPLETE CBC W/AUTO DIFF WBC: CPT | Performed by: FAMILY MEDICINE

## 2024-08-09 PROCEDURE — G0296 VISIT TO DETERM LDCT ELIG: HCPCS | Performed by: FAMILY MEDICINE

## 2024-08-09 PROCEDURE — 99397 PER PM REEVAL EST PAT 65+ YR: CPT | Performed by: FAMILY MEDICINE

## 2024-08-09 PROCEDURE — 36415 COLL VENOUS BLD VENIPUNCTURE: CPT | Performed by: FAMILY MEDICINE

## 2024-08-09 PROCEDURE — 84443 ASSAY THYROID STIM HORMONE: CPT | Performed by: FAMILY MEDICINE

## 2024-08-09 PROCEDURE — 99213 OFFICE O/P EST LOW 20 MIN: CPT | Mod: 25 | Performed by: FAMILY MEDICINE

## 2024-08-09 RX ORDER — LEVOTHYROXINE SODIUM 112 UG/1
TABLET ORAL
Qty: 90 TABLET | Refills: 3 | Status: SHIPPED | OUTPATIENT
Start: 2024-08-09

## 2024-08-09 RX ORDER — ESCITALOPRAM OXALATE 5 MG/1
5 TABLET ORAL DAILY
COMMUNITY
Start: 2024-07-15

## 2024-08-09 RX ORDER — FLUTICASONE PROPIONATE 50 MCG
2 SPRAY, SUSPENSION (ML) NASAL DAILY
Qty: 11.1 ML | Refills: 1 | Status: SHIPPED | OUTPATIENT
Start: 2024-08-09

## 2024-08-09 RX ORDER — LEVOTHYROXINE SODIUM 100 UG/1
100 TABLET ORAL DAILY
Qty: 90 TABLET | Refills: 3 | Status: SHIPPED | OUTPATIENT
Start: 2024-08-09

## 2024-08-09 NOTE — PROGRESS NOTES
Lung Cancer Screening Shared Decision Making Visit     Jane Weldon, a 68 year old female, is eligible for lung cancer screening    History   Smoking Status     Every Day     Packs/day: 0.50     Years: 40.00     Types: Cigarettes   Smokeless Tobacco     Never       I have discussed with patient the risks and benefits of screening for lung cancer with low-dose CT.     The risks include:    radiation exposure: one low dose chest CT has as much ionizing radiation as about 15 chest x-rays, or 6 months of background radiation living in [place]      false positives: most findings/nodules are NOT cancer, but some might still require additional diagnostic evaluation, including biopsy    over-diagnosis: some slow growing cancers that might never have been clinically significant will be detected and treated unnecessarily     The benefit of early detection of lung cancer is contingent upon adherence to annual screening or more frequent follow up if indicated.     Furthermore, to benefit from screening, Jane must be willing and able to undergo diagnostic procedures, if indicated. Although no specific guide is available for determining severity of comorbidities, it is reasonable to withhold screening in patients who have greater mortality risk from other diseases.     We did discuss that the best way to prevent lung cancer is to not smoke.    Some patients may value a numeric estimation of lung cancer risk when evaluating if lung cancer screening is right for them, here is one calculator:    ShouldIScreen

## 2024-08-09 NOTE — PROGRESS NOTES
Preventive Care Visit  [facility]  Nina Orellana MD, Family Medicine  Aug 9, 2024      Assessment & Plan       ICD-10-CM    1. Encounter for Medicare annual wellness exam  Z00.00 Comprehensive metabolic panel (BMP + Alb, Alk Phos, ALT, AST, Total. Bili, TP)     Lipid panel reflex to direct LDL Fasting     CBC with platelets and differential     Comprehensive metabolic panel (BMP + Alb, Alk Phos, ALT, AST, Total. Bili, TP)     CBC with platelets and differential      2. Hypothyroidism, unspecified type  E03.9 levothyroxine (SYNTHROID/LEVOTHROID) 100 MCG tablet     levothyroxine (SYNTHROID/LEVOTHROID) 112 MCG tablet     TSH with free T4 reflex     TSH with free T4 reflex      3. Personal history of tobacco use  Z87.891 Prof fee: Shared Decision Making for Lung Cancer Screening     CT Chest Lung Cancer Scrn Low Dose wo      4. Screen for colon cancer  Z12.11 Colonoscopy Screening  Referral      5. Allergic rhinitis, unspecified seasonality, unspecified trigger  J30.9 fluticasone (FLONASE) 50 MCG/ACT nasal spray        Patient here for Medicare annual wellness exam.  Thyroid now in range.  Health maintenance labs ordered for future.  Lung cancer screening discussed and ordered.  Flonase refilled.  Colonoscopy ordered again.    May not be pursuing mammogram and DEXA scan.  Aware that orders are in place.  Declines immunization including tetanus.    MEDICATIONS:  Continue current medications without change  See Patient Instructions    Suhail Lara is a 68 year old, presenting for the following:  Physical        8/9/2024    12:52 PM   Additional Questions   Roomed by JUAREZ Fregoso CMA([facility])         Health Care Directive  Patient does not have a Health Care Directive or Living Will: Discussed advance care planning with patient; however, patient declined at this time.    HPI               No data to display                   No data to display                   No data to display                   1/11/2024   Social Factors   Worry food won't last until get money to buy more No   Food not last or not have enough money for food? No   Do you have housing? (Housing is defined as stable permanent housing and does not include staying ouside in a car, in a tent, in an abandoned building, in an overnight shelter, or couch-surfing.) Yes   Are you worried about losing your housing? No   Lack of transportation? No   Unable to get utilities (heat,electricity)? No             No data to display                   No data to display                   No data to display                     No data to display                       Today's PHQ-2 Score:       1/11/2024     9:57 AM   PHQ-2 ( 1999 [employer])   Q1: Little interest or pleasure in doing things 1   Q2: Feeling down, depressed or hopeless 2   PHQ-2 Score 3   Q1: Little interest or pleasure in doing things Several days   Q2: Feeling down, depressed or hopeless More than half the days   PHQ-2 Score 3          No data to display              Social History     Tobacco Use    Smoking status: Every Day     Current packs/day: 0.50     Average packs/day: 0.5 packs/day for 40.0 years (20.0 ttl pk-yrs)     Types: Cigarettes    Smokeless tobacco: Never   Vaping Use    Vaping status: Never Used   Substance Use Topics    Alcohol use: Yes     Comment: Alcoholic Drinks/day: occasional wine    Drug use: No          Mammogram Screening - Mammography discussed and declined      History of abnormal Pap smear: No - age 65 or older with adequate negative prior screening test results (3 consecutive negative cytology results, 2 consecutive negative cotesting results, or 2 consecutive negative HrHPV test results within 10 years, with the most recent test occurring within the recommended screening interval for the test used)       ASCVD Risk   The 10-year ASCVD risk score (Myles MCALLISTER, et al., 2019) is: 6.7%    Values used to calculate the score:      Age: 68 years       Sex: Female      Is Non- : No      Diabetic: No      Tobacco smoker: Yes      Systolic Blood Pressure: 101 mmHg      Is BP treated: No      HDL Cholesterol: 70 mg/dL      Total Cholesterol: 213 mg/dL            Reviewed and updated as needed this visit by Provider   Tobacco  Allergies  Meds  Problems  Med Hx  Surg Hx  Fam Hx            Past Medical History:   Diagnosis Date    Osteopenia      Past Surgical History:   Procedure Laterality Date    IN TREAT ECTOPIC PREG,RMV TUBE/OVARY      Description: Salpingectomy For Ectopic Pregnancy;  Recorded: 05/04/2009;    [facility] APPENDECTOMY      Description: Appendectomy;  Recorded: 05/04/2009;     BP Readings from Last 3 Encounters:   08/09/24 101/70   03/05/24 106/68   01/11/24 102/73    Wt Readings from Last 3 Encounters:   08/09/24 54.5 kg (120 lb 3.2 oz)   03/05/24 55.2 kg (121 lb 9.6 oz)   01/11/24 56.7 kg (125 lb)                  Patient Active Problem List   Diagnosis    Allergic Rhinitis    Hoarseness    Insomnia    Lipoma Of The Adipose Tissue    Tingling (Paresthesia)    Hypothyroidism    Osteopenia    Symptomatic Menopause    Osteoporosis    Nocturia    Anxiety    Insomnia    Nicotine abuse    Hoarseness    History of wrist fracture    History of rib fracture     Past Surgical History:   Procedure Laterality Date    IN TREAT ECTOPIC PREG,RMV TUBE/OVARY      Description: Salpingectomy For Ectopic Pregnancy;  Recorded: 05/04/2009;    [facility] APPENDECTOMY      Description: Appendectomy;  Recorded: 05/04/2009;       Social History     Tobacco Use    Smoking status: Every Day     Current packs/day: 0.50     Average packs/day: 0.5 packs/day for 40.0 years (20.0 ttl pk-yrs)     Types: Cigarettes    Smokeless tobacco: Never   Substance Use Topics    Alcohol use: Yes     Comment: Alcoholic Drinks/day: occasional wine     History reviewed. No pertinent family history.      Current Outpatient Medications   Medication Sig Dispense Refill    ascorbic  acid (VITAMIN C) Powd [ASCORBIC ACID (VITAMIN C) POWD] Take by mouth daily. 1/2 tsp 2000mg      CHOLECALCIFEROL, VITAMIN D3, (VITAMIN D3 ORAL) [CHOLECALCIFEROL, VITAMIN D3, (VITAMIN D3 ORAL)] Take 5,000 Units by mouth daily.      escitalopram (LEXAPRO) 10 MG tablet Take 15 mg by mouth daily      escitalopram (LEXAPRO) 5 MG tablet Take 5 mg by mouth daily      fluticasone (FLONASE) 50 MCG/ACT nasal spray Spray 2 sprays into both nostrils daily 11.1 mL 1    levothyroxine (SYNTHROID/LEVOTHROID) 100 MCG tablet Take 1 tablet (100 mcg) by mouth daily Alternate with 112 mcg 90 tablet 3    levothyroxine (SYNTHROID/LEVOTHROID) 112 MCG tablet Alternate with 100 mcg 90 tablet 3    zolpidem (AMBIEN) 5 MG tablet        Current providers sharing in care for this patient include:  Patient Care Team:  Nina Orellana MD as PCP - General (Family Medicine)  Nina Orellana MD as Assigned PCP    The following health maintenance items are reviewed in Epic and correct as of today:  Health Maintenance   Topic Date Due    DEXA  Never done    ADVANCE CARE PLANNING  Never done    COLORECTAL CANCER SCREENING  Never done    ZOSTER IMMUNIZATION (1 of 2) Never done    MAMMO SCREENING  10/10/2015    RSV VACCINE (Pregnancy & 60+) (1 - 1-dose 60+ series) Never done    LUNG CANCER SCREENING  11/29/2017    COVID-19 Vaccine (3 - 2023-24 season) 09/01/2023    DTAP/TDAP/TD IMMUNIZATION (3 - Td or Tdap) 10/15/2023    INFLUENZA VACCINE (1) 09/01/2024    NICOTINE/TOBACCO CESSATION COUNSELING Q 1 YR  01/11/2025    FALL RISK ASSESSMENT  01/11/2025    ANNUAL REVIEW OF HM ORDERS  03/05/2025    TSH W/FREE T4 REFLEX  04/25/2025    MEDICARE ANNUAL WELLNESS VISIT  08/09/2025    GLUCOSE  12/16/2025    LIPID  03/02/2026    HEPATITIS C SCREENING  Completed    PHQ-2 (once per calendar year)  Completed    Pneumococcal Vaccine: 65+ Years  Completed    IPV IMMUNIZATION  Aged Out    HPV IMMUNIZATION  Aged Out    MENINGITIS IMMUNIZATION  Aged Out    RSV MONOCLONAL  "ANTIBODY  Aged Out    PAP  Discontinued         Review of Systems  Constitutional, neuro, ENT, endocrine, pulmonary, cardiac, gastrointestinal, genitourinary, musculoskeletal, integument and psychiatric systems are negative, except as otherwise noted.     Objective    Exam  /70   Pulse 75   Temp 98.5  F (36.9  C) (Oral)   Resp 22   Ht 1.619 m (5' 3.75\")   Wt 54.5 kg (120 lb 3.2 oz)   SpO2 96%   BMI 20.79 kg/m     Estimated body mass index is 20.79 kg/m  as calculated from the following:    Height as of this encounter: 1.619 m (5' 3.75\").    Weight as of this encounter: 54.5 kg (120 lb 3.2 oz).    Physical Exam  GENERAL: alert and no distress        8/9/2024   Mini Cog   Clock Draw Score 2 Normal   3 Item Recall 2 objects recalled   Mini Cog Total Score 4                 Signed Electronically by: Nina Orellana MD    "

## 2024-08-09 NOTE — PATIENT INSTRUCTIONS
Patient Education   Preventive Care Advice   This is general advice given by our system to help you stay healthy. However, your care team may have specific advice just for you. Please talk to your care team about your preventive care needs.  Nutrition  Eat 5 or more servings of fruits and vegetables each day.  Try wheat bread, brown rice and whole grain pasta (instead of white bread, rice, and pasta).  Get enough calcium and vitamin D. Check the label on foods and aim for 100% of the RDA (recommended daily allowance).  Lifestyle  Exercise at least 150 minutes each week  (30 minutes a day, 5 days a week).  Do muscle strengthening activities 2 days a week. These help control your weight and prevent disease.  No smoking.  Wear sunscreen to prevent skin cancer.  Have a dental exam and cleaning every 6 months.  Yearly exams  See your health care team every year to talk about:  Any changes in your health.  Any medicines your care team has prescribed.  Preventive care, family planning, and ways to prevent chronic diseases.  Shots (vaccines)   HPV shots (up to age 26), if you've never had them before.  Hepatitis B shots (up to age 59), if you've never had them before.  COVID-19 shot: Get this shot when it's due.  Flu shot: Get a flu shot every year.  Tetanus shot: Get a tetanus shot every 10 years.  Pneumococcal, hepatitis A, and RSV shots: Ask your care team if you need these based on your risk.  Shingles shot (for age 50 and up)  General health tests  Diabetes screening:  Starting at age 35, Get screened for diabetes at least every 3 years.  If you are younger than age 35, ask your care team if you should be screened for diabetes.  Cholesterol test: At age 39, start having a cholesterol test every 5 years, or more often if advised.  Bone density scan (DEXA): At age 50, ask your care team if you should have this scan for osteoporosis (brittle bones).  Hepatitis C: Get tested at least once in your life.  STIs (sexually  transmitted infections)  Before age 24: Ask your care team if you should be screened for STIs.  After age 24: Get screened for STIs if you're at risk. You are at risk for STIs (including HIV) if:  You are sexually active with more than one person.  You don't use condoms every time.  You or a partner was diagnosed with a sexually transmitted infection.  If you are at risk for HIV, ask about PrEP medicine to prevent HIV.  Get tested for HIV at least once in your life, whether you are at risk for HIV or not.  Cancer screening tests  Cervical cancer screening: If you have a cervix, begin getting regular cervical cancer screening tests starting at age 21.  Breast cancer scan (mammogram): If you've ever had breasts, begin having regular mammograms starting at age 40. This is a scan to check for breast cancer.  Colon cancer screening: It is important to start screening for colon cancer at age 45.  Have a colonoscopy test every 10 years (or more often if you're at risk) Or, ask your provider about stool tests like a FIT test every year or Cologuard test every 3 years.  To learn more about your testing options, visit:   .  For help making a decision, visit:   https://bit.ly/qs23118.  Prostate cancer screening test: If you have a prostate, ask your care team if a prostate cancer screening test (PSA) at age 55 is right for you.  Lung cancer screening: If you are a current or former smoker ages 50 to 80, ask your care team if ongoing lung cancer screenings are right for you.  For informational purposes only. Not to replace the advice of your health care provider. Copyright   2023 [facility]. All rights reserved. Clinically reviewed by the [facility] Transitions Program. [employer] [id number] - REV 01/24.     Lung Cancer Screening   Frequently Asked Questions  If you are at high-risk for lung cancer, getting screened with low-dose computed tomography (LDCT) every year can help save your life. This handout offers  answers to some of the most common questions about lung cancer screening. If you have other questions, please call [phone] ([phone]).     What is it?  Lung cancer screening uses special X-ray technology to create an image of your lung tissue. The exam is quick and easy and takes less than 10 seconds. We don t give you any medicine or use any needles. You can eat before and after the exam. You don t need to change your clothes as long as the clothing on your chest doesn t contain metal. But, you do need to be able to hold your breath for at least 6 seconds during the exam.    What is the goal of lung cancer screening?  The goal of lung cancer screening is to save lives. Many times, lung cancer is not found until a person starts having physical symptoms. Lung cancer screening can help detect lung cancer in the earliest stages when it may be easier to treat.    Who should be screened for lung cancer?  We suggest lung cancer screening for anyone who is at high-risk for lung cancer. You are in the high-risk group if you:      are between the ages of 55 and 79, and    have smoked at least 1 pack of cigarettes a day for 20 or more years, and    still smoke or have quit within the past 15 years.    However, if you have a new cough or shortness of breath, you should talk to your doctor before being screened.    Why does it matter if I have symptoms?  Certain symptoms can be a sign that you have a condition in your lungs that should be checked and treated by your doctor. These symptoms include fever, chest pain, a new or changing cough, shortness of breath that you have never felt before, coughing up blood or unexplained weight loss. Having any of these symptoms can greatly affect the results of lung cancer screening.       Should all smokers get an LDCT lung cancer screening exam?  It depends. Lung cancer screening is for a very specific group of men and women who have a history of heavy smoking over a long  period of time (see  Who should be screened for lung cancer  above).  I am in the high-risk group, but have been diagnosed with cancer in the past. Is LDCT lung cancer screening right for me?  In some cases, you should not have LDCT lung screening, such as when your doctor is already following your cancer with CT scan studies. Your doctor will help you decide if LDCT lung screening is right for you.  Do I need to have a screening exam every year?  Yes. If you are in the high-risk group described earlier, you should get an LDCT lung cancer screening exam every year until you are 79, or are no longer willing or able to undergo screening and possible procedures to diagnose and treat lung cancer.  How effective is LDCT at preventing death from lung cancer?  Studies have shown that LDCT lung cancer screening can lower the risk of death from lung cancer by 20 percent in people who are at high-risk.  What are the risks?  There are some risks and limitations of LDCT lung cancer screening. We want to make sure you understand the risks and benefits, so please let us know if you have any questions. Your doctor may want to talk with you more about these risks.    Radiation exposure: As with any exam that uses radiation, there is a very small increased risk of cancer. The amount of radiation in LDCT is small--about the same amount a person would get from a mammogram. Your doctor orders the exam when he or she feels the potential benefits outweigh the risks.    False negatives: No test is perfect, including LDCT. It is possible that you may have a medical condition, including lung cancer, that is not found during your exam. This is called a false negative result.    False positives and more testing: LDCT very often finds something in the lung that could be cancer, but in fact is not. This is called a false positive result. False positive tests often cause anxiety. To make sure these findings are not cancer, you may need to have  more tests. These tests will be done only if you give us permission. Sometimes patients need a treatment that can have side effects, such as a biopsy. For more information on false positives, see  What can I expect from the results?     Findings not related to lung cancer: Your LDCT exam also takes pictures of areas of your body next to your lungs. In a very small number of cases, the CT scan will show an abnormal finding in one of these areas, such as your kidneys, adrenal glands, liver or thyroid. This finding may not be serious, but you may need more tests. Your doctor can help you decide what other tests you may need, if any.  What can I expect from the results?  About 1 out of 4 LDCT exams will find something that may need more tests. Most of the time, these findings are lung nodules. Lung nodules are very small collections of tissue in the lung. These nodules are very common, and the vast majority--more than 97 percent--are not cancer (benign). Most are normal lymph nodes or small areas of scarring from past infections.  But, if a small lung nodule is found to be cancer, the cancer can be cured more than 90 percent of the time. To know if the nodule is cancer, we may need to get more images before your next yearly screening exam. If the nodule has suspicious features (for example, it is large, has an odd shape or grows over time), we will refer you to a specialist for further testing.  Will my doctor also get the results?  Yes. Your doctor will get a copy of your results.  Is it okay to keep smoking now that there s a cancer screening exam?  No. Tobacco is one of the strongest cancer-causing agents. It causes not only lung cancer, but other cancers and cardiovascular (heart) diseases as well. The damage caused by smoking builds over time. This means that the longer you smoke, the higher your risk of disease. While it is never too late to quit, the sooner you quit, the better.  Where can I find help to quit  smoking?  The best way to prevent lung cancer is to stop smoking. If you have already quit smoking, congratulations and keep it up! For help on quitting smoking, please call [employer] at 2-452-QUITNOW (1-422.430.6889) or the American [employer] Society at 1-393.943.4915 to find local resources near you.  One-on-one health coaching:  If you d prefer to work individually with a health care provider on tobacco cessation, we offer:      Medication Therapy Management:  Our specially trained pharmacists work closely with you and your doctor to help you quit smoking.  Call [phone] or [phone] (toll free).

## 2024-08-20 ENCOUNTER — PATIENT OUTREACH (OUTPATIENT)
Dept: GERIATRIC MEDICINE | Facility: CLINIC | Age: 69
End: 2024-08-20

## 2024-08-20 ASSESSMENT — ACTIVITIES OF DAILY LIVING (ADL): DEPENDENT_IADLS:: INDEPENDENT

## 2024-08-20 NOTE — PROGRESS NOTES
"[facility] Care Coordination Contact      [facility] Refusal Telephone Assessment    Member refused home visit HRA on 08/20/24 (reason: Refuse HV and \" I don't need services at this time.\").    ER visits: No  Hospitalizations: No  Health concerns: none  Falls/Injuries: No  ADL/IADL Dependencies:  Reports she is independent with her ADLs and IADLS.         Member currently receiving the following home care services:   none  Member currently receiving the following community resources:  none  Informal support(s):  n/a    Advanced Care Planning discussion, complete code section.    [facility] sponsored benefits: Shared information re: Silver Sneakers/gym memberships, ASA, Calcium +D.    Follow-Up Plan: Member informed of future contact, plan to f/u with member with a 6 month telephone assessment and offer a home visit.  Contact information shared with member and family, encouraged member to call with any questions or concerns at any time.    This CC note routed to PCP, Nina Orellana.    Deloris Munoz RN PHN [facility]  [phone]        "

## 2024-08-20 NOTE — LETTER
August 21, 2024    NASIM KOWALSKI  [address]   [place] [address]        Dear Nasim:    As a member of [facility]'s MSC+ program, we offer a health risk assessment at no cost to you. I know you don't want to have the assessment right now. If you change your mind, please call me at the number below.    Who performs the health risk assessment?  A [facility] Care Coordinator performs the assessment. Our Care Coordinators can also help you understand your benefits. They can tell you about services to aid you at home, such as managing your care with your doctors if your health worsens.    Our Care Coordinators are here for you if you need:  Support for activities you used to do by yourself (including making meals, bathing and paying bills)  Equipment for bathroom or home safety  Help finding a new place to live  Information on staying healthy, preventing falls and immunizations    Questions?  If you have questions, or you would like to do he assessment, call me at [phone]. TTY users call [phone]. I'm here from 8am to 5pm. I may reach out to you again soon.       Sincerely,           Deloris Munoz RN  [phone]  [email or web address]        [facility]      \<[id number] accepted  [id number]>    [id number] (21/2021)

## 2024-08-20 NOTE — Clinical Note
Refuse HRA HV on 08/20/24.  Reports she doesn't need services at this time.    Deloris Munoz RN PHN [facility] [phone]

## 2024-08-21 NOTE — PROGRESS NOTES
"[facility] Care Coordination Contact    Per CC, mailed client a \"Refusal of Home Visit\" letter.    Avelina Garcia  Care Management Specialist  [facility]  [phone]       "

## 2024-09-18 ENCOUNTER — PATIENT OUTREACH (OUTPATIENT)
Dept: FAMILY MEDICINE | Facility: CLINIC | Age: 69
End: 2024-09-18

## 2024-09-18 NOTE — TELEPHONE ENCOUNTER
Patient Quality Outreach    Patient is due for the following:   Breast Cancer Screening - Mammogram    Next Steps:   Schedule mammo    Type of outreach:    Sent [employer] message.      Questions for provider review:    None           Rin Floyd MA

## 2024-12-10 ENCOUNTER — PATIENT OUTREACH (OUTPATIENT)
Dept: FAMILY MEDICINE | Facility: CLINIC | Age: 69
End: 2024-12-10
Payer: COMMERCIAL

## 2024-12-10 NOTE — TELEPHONE ENCOUNTER
Patient Quality Outreach    Patient is due for the following:   Colon Cancer Screening  Breast Cancer Screening - Mammogram      Topic Date Due    Zoster (Shingles) Vaccine (1 of 2) Never done    Diptheria Tetanus Pertussis (DTAP/TDAP/TD) Vaccine (3 - Td or Tdap) 10/15/2023    Flu Vaccine (1) 09/01/2024    COVID-19 Vaccine (3 - 2024-25 season) 09/01/2024     Dexa    Action(s) Taken:   Scheduling imaging appts.    Type of outreach:    Sent [email or web address] message.    Questions for provider review:    None           Rin Floyd MA

## 2025-02-13 ENCOUNTER — PATIENT OUTREACH (OUTPATIENT)
Dept: GERIATRIC MEDICINE | Facility: CLINIC | Age: 70
End: 2025-02-13

## 2025-02-13 NOTE — PROGRESS NOTES
[facility] Care Coordination Contact      [facility] Six-Month Telephone Assessment    6 month telephone assessment completed on 02/13/2025.    ER visits: No  Hospitalizations: No  TCU stays: No  Significant health status changes: no changes in health  Falls/Injuries: No  ADL/IADL changes: No  Changes in services: No    Caregiver Assessment follow up:  n/a    Goals: See Support Plan for goal progress documentation.  Reports no falls, hospitalizations or ER visits.      Will see member in 6 months for an annual health risk assessment.   Encouraged member to call CC with any questions or concerns in the meantime.     Deloris Munoz RN PHN [facility]  [phone]

## 2025-05-06 ENCOUNTER — TELEPHONE (OUTPATIENT)
Dept: FAMILY MEDICINE | Facility: CLINIC | Age: 70
End: 2025-05-06

## 2025-05-06 ENCOUNTER — PATIENT OUTREACH (OUTPATIENT)
Dept: GERIATRIC MEDICINE | Facility: CLINIC | Age: 70
End: 2025-05-06

## 2025-05-06 NOTE — Clinical Note
Please contact the member to provide assistance with housing resources.  Jane reported that due to a recent rent increase, she can no longer afford her current residence.  Her lease is set to end in 3 months, and she is seeking support in identifying affordable housing options.  Deloris Munoz RN PHN [facility] [phone]
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
## 2025-05-08 ENCOUNTER — PATIENT OUTREACH (OUTPATIENT)
Dept: GERIATRIC MEDICINE | Facility: CLINIC | Age: 70
End: 2025-05-08
Payer: COMMERCIAL

## 2025-05-08 NOTE — PROGRESS NOTES
[facility] Care Coordination Contact     CHW per care coordinator Deloris Munoz followed up w/ member on housing.   CHW, lvm to return call left contact info.      JERONIMO Campos  [facility]  [phone]

## 2025-07-10 ENCOUNTER — PATIENT OUTREACH (OUTPATIENT)
Dept: CARE COORDINATION | Facility: CLINIC | Age: 70
End: 2025-07-10
Payer: COMMERCIAL

## 2025-07-10 ENCOUNTER — PATIENT OUTREACH (OUTPATIENT)
Dept: GERIATRIC MEDICINE | Facility: CLINIC | Age: 70
End: 2025-07-10
Payer: COMMERCIAL

## 2025-07-10 NOTE — PROGRESS NOTES
[facility] Care Coordination Contact       CHW received return call from Member on housing. Member, noted she had applied for section 8 6 yrs ago and has not heard back and has attempted to connect w/ the county but gets frustrated because she feels they give her the run around and this along w/ housing search is challenging because she has to make so many calls. CHW, shared she would  check into resources which may be able to help  her w/ the process and follow upon 7/17/25      Member, noted she is not able to pay her rent w/ increase and needs  subsidy to help off set.    Member heard her current complex is now accepting section 8 and inquired if she would be eligible. CHW advised Member to talk w/ onsite  and sometimes landlords are willing to help w/ section 8 application. Member was also provided  [facility]  property  Mgmt group # [phone] who manages [facility] where she lives as resource if she was unable to connect w/onsite manager.  CHW, provided the following resources  to  check for availability for housing and encouraged Member to follow up w/ [facility]([phone]) on application and shared she may need to reapply.      Member agreed she would follow up  w/ her  and call Housing as first step and then follow up w/ Matt. Member does not have a computer but can access at [employer] if  she needs to complete applications.      CHW will follow up w/ member on 7/17/25     Housing Contacts    Matt- [phone]   [facility] Property Gujw-[phone]

## 2025-07-14 ENCOUNTER — PATIENT OUTREACH (OUTPATIENT)
Dept: GERIATRIC MEDICINE | Facility: CLINIC | Age: 70
End: 2025-07-14
Payer: COMMERCIAL

## 2025-07-14 NOTE — LETTER
July 15, 2025    JANE KOWALSKI  [address]   [place] [address]        Dear Jane:    As a member of [facility]'s MSC+ program, we offer a health risk assessment at no cost to you. I know you don't want to have the assessment right now. If you change your mind, please call me at the number below.    Who performs the health risk assessment?  A [facility] Care Coordinator performs the assessment. The assessment helps to identify your health and wellness goals. Our care coordinators can also help you understand your benefits, find primary health care providers and arrange transportation for medical care.    Our Care Coordinators are here:  When you need services set up in your home   To help you when your health changes or you're hospitalized  To give you information on staying healthy, preventing falls and immunizations    Questions?  If you have questions, or would like to do the assessment, call me at [phone]. TTY users call [phone].      Sincerely,          Deloris Munoz RN  [phone]  [email or web address]        [facility]          [id number]  [id number] (12/2024)

## 2025-07-14 NOTE — PROGRESS NOTES
[facility] Care Coordination Contact      [facility] Refusal Telephone Assessment    Member refused home visit HRA on 07/14/25 (reason: Refuse home visit due to no services for ADLS and IADLS).    ER visits: No  Hospitalizations: No  Health concerns: Interested in ARHMS worker to assist with her mental health  Falls/Injuries: No  ADL/IADL Dependencies: Jane reports she is independent with her ADLs and IADLS.        Member currently receiving the following home care services:   no  Member currently receiving the following community resources:  no.  CC will submit ARHRMS worker referral at [facility]  Informal support(s):  none    Advanced Care Planning discussion, complete code section.    Health Plan sponsored benefits: [employer] MSC+: Shared information regarding preventative health screening and health plan supplemental benefits/incentives. Reviewed medication disposal form and transition of care member handout.    Follow-Up Plan: Member informed of future contact, plan to f/u with member with a 6 month telephone assessment and offer a home visit.  Contact information shared with member and family, encouraged member to call with any questions or concerns at any time.    This CC note routed to PCP, Nina Orellana.

## 2025-07-15 NOTE — PROGRESS NOTES
"[facility] Care Coordination Contact    Per CC, mailed client a \"Refusal of Home Visit\" letter and uploaded Refusal POC to MALU Nixon ([employer])  Care Management Specialist  [facility]  [phone]          "

## 2025-07-24 ENCOUNTER — PATIENT OUTREACH (OUTPATIENT)
Dept: CARE COORDINATION | Facility: CLINIC | Age: 70
End: 2025-07-24
Payer: COMMERCIAL

## 2025-08-10 ENCOUNTER — HEALTH MAINTENANCE LETTER (OUTPATIENT)
Age: 70
End: 2025-08-10

## 2025-09-03 ENCOUNTER — PATIENT OUTREACH (OUTPATIENT)
Dept: GERIATRIC MEDICINE | Facility: CLINIC | Age: 70
End: 2025-09-03
Payer: COMMERCIAL